# Patient Record
Sex: FEMALE | Race: WHITE | NOT HISPANIC OR LATINO | Employment: OTHER | URBAN - METROPOLITAN AREA
[De-identification: names, ages, dates, MRNs, and addresses within clinical notes are randomized per-mention and may not be internally consistent; named-entity substitution may affect disease eponyms.]

---

## 2019-11-14 ENCOUNTER — CONVERSION ENCOUNTER (OUTPATIENT)
Dept: FAMILY MEDICINE CLINIC | Facility: CLINIC | Age: 39
End: 2019-11-14

## 2019-11-14 ENCOUNTER — OFFICE VISIT CONVERTED (OUTPATIENT)
Dept: FAMILY MEDICINE CLINIC | Facility: CLINIC | Age: 39
End: 2019-11-14
Attending: NURSE PRACTITIONER

## 2019-11-25 ENCOUNTER — HOSPITAL ENCOUNTER (OUTPATIENT)
Dept: LAB | Facility: HOSPITAL | Age: 39
Discharge: HOME | End: 2019-11-25
Attending: NURSE PRACTITIONER

## 2019-11-25 LAB
ALBUMIN SERPL-MCNC: 4.2 G/DL (ref 3.5–5)
ALBUMIN/GLOB SERPL: 1.4 {RATIO} (ref 1.4–2.6)
ALP SERPL-CCNC: 57 U/L (ref 42–98)
ALT SERPL-CCNC: 12 U/L (ref 10–40)
ANION GAP SERPL CALC-SCNC: 16 MMOL/L (ref 8–19)
AST SERPL-CCNC: 16 U/L (ref 15–50)
BASOPHILS # BLD AUTO: 0.05 10*3/UL (ref 0–0.2)
BASOPHILS NFR BLD AUTO: 0.7 % (ref 0–3)
BILIRUB SERPL-MCNC: 0.67 MG/DL (ref 0.2–1.3)
BUN SERPL-MCNC: 12 MG/DL (ref 5–25)
BUN/CREAT SERPL: 17 {RATIO} (ref 6–20)
CALCIUM SERPL-MCNC: 9.2 MG/DL (ref 8.7–10.4)
CHLORIDE SERPL-SCNC: 103 MMOL/L (ref 99–111)
CHOLEST SERPL-MCNC: 140 MG/DL (ref 107–200)
CHOLEST/HDLC SERPL: 3.8 {RATIO} (ref 3–6)
CONV ABS IMM GRAN: 0.02 10*3/UL (ref 0–0.2)
CONV CO2: 23 MMOL/L (ref 22–32)
CONV IMMATURE GRAN: 0.3 % (ref 0–1.8)
CONV TOTAL PROTEIN: 7.1 G/DL (ref 6.3–8.2)
CREAT UR-MCNC: 0.69 MG/DL (ref 0.5–0.9)
DEPRECATED RDW RBC AUTO: 44.5 FL (ref 36.4–46.3)
EOSINOPHIL # BLD AUTO: 0.1 10*3/UL (ref 0–0.7)
EOSINOPHIL # BLD AUTO: 1.5 % (ref 0–7)
ERYTHROCYTE [DISTWIDTH] IN BLOOD BY AUTOMATED COUNT: 12.2 % (ref 11.7–14.4)
GFR SERPLBLD BASED ON 1.73 SQ M-ARVRAT: >60 ML/MIN/{1.73_M2}
GLOBULIN UR ELPH-MCNC: 2.9 G/DL (ref 2–3.5)
GLUCOSE SERPL-MCNC: 87 MG/DL (ref 65–99)
HCT VFR BLD AUTO: 38.2 % (ref 37–47)
HDLC SERPL-MCNC: 37 MG/DL (ref 40–60)
HGB BLD-MCNC: 12.8 G/DL (ref 12–16)
LDLC SERPL CALC-MCNC: 93 MG/DL (ref 70–100)
LYMPHOCYTES # BLD AUTO: 2.23 10*3/UL (ref 1–5)
LYMPHOCYTES NFR BLD AUTO: 32.7 % (ref 20–45)
MCH RBC QN AUTO: 33.2 PG (ref 27–31)
MCHC RBC AUTO-ENTMCNC: 33.5 G/DL (ref 33–37)
MCV RBC AUTO: 99 FL (ref 81–99)
MONOCYTES # BLD AUTO: 0.53 10*3/UL (ref 0.2–1.2)
MONOCYTES NFR BLD AUTO: 7.8 % (ref 3–10)
NEUTROPHILS # BLD AUTO: 3.89 10*3/UL (ref 2–8)
NEUTROPHILS NFR BLD AUTO: 57 % (ref 30–85)
NRBC CBCN: 0 % (ref 0–0.7)
OSMOLALITY SERPL CALC.SUM OF ELEC: 285 MOSM/KG (ref 273–304)
PLATELET # BLD AUTO: 233 10*3/UL (ref 130–400)
PMV BLD AUTO: 11.3 FL (ref 9.4–12.3)
POTASSIUM SERPL-SCNC: 4.2 MMOL/L (ref 3.5–5.3)
RBC # BLD AUTO: 3.86 10*6/UL (ref 4.2–5.4)
SODIUM SERPL-SCNC: 138 MMOL/L (ref 135–147)
T4 FREE SERPL-MCNC: 1.2 NG/DL (ref 0.9–1.8)
TRIGL SERPL-MCNC: 51 MG/DL (ref 40–150)
TSH SERPL-ACNC: 1.83 M[IU]/L (ref 0.27–4.2)
VLDLC SERPL-MCNC: 10 MG/DL (ref 5–37)
WBC # BLD AUTO: 6.82 10*3/UL (ref 4.8–10.8)

## 2019-12-30 ENCOUNTER — HOSPITAL ENCOUNTER (OUTPATIENT)
Dept: GENERAL RADIOLOGY | Facility: HOSPITAL | Age: 39
Discharge: HOME | End: 2019-12-30
Attending: NURSE PRACTITIONER

## 2020-03-13 ENCOUNTER — OFFICE VISIT CONVERTED (OUTPATIENT)
Dept: FAMILY MEDICINE CLINIC | Facility: CLINIC | Age: 40
End: 2020-03-13
Attending: PHYSICIAN ASSISTANT

## 2020-05-18 ENCOUNTER — TELEMEDICINE CONVERTED (OUTPATIENT)
Dept: FAMILY MEDICINE CLINIC | Facility: CLINIC | Age: 40
End: 2020-05-18
Attending: NURSE PRACTITIONER

## 2020-09-24 ENCOUNTER — HOSPITAL ENCOUNTER (OUTPATIENT)
Dept: LAB | Facility: HOSPITAL | Age: 40
Discharge: HOME | End: 2020-09-24
Attending: NURSE PRACTITIONER

## 2020-09-24 LAB
ALBUMIN SERPL-MCNC: 4.3 G/DL (ref 3.5–5)
ALBUMIN/GLOB SERPL: 1.6 {RATIO} (ref 1.4–2.6)
ALP SERPL-CCNC: 69 U/L (ref 42–98)
ALT SERPL-CCNC: 12 U/L (ref 10–40)
ANION GAP SERPL CALC-SCNC: 14 MMOL/L (ref 8–19)
AST SERPL-CCNC: 19 U/L (ref 15–50)
BASOPHILS # BLD AUTO: 0.04 10*3/UL (ref 0–0.2)
BASOPHILS NFR BLD AUTO: 0.7 % (ref 0–3)
BILIRUB SERPL-MCNC: 0.55 MG/DL (ref 0.2–1.3)
BUN SERPL-MCNC: 13 MG/DL (ref 5–25)
BUN/CREAT SERPL: 14 {RATIO} (ref 6–20)
CALCIUM SERPL-MCNC: 9.4 MG/DL (ref 8.7–10.4)
CHLORIDE SERPL-SCNC: 103 MMOL/L (ref 99–111)
CHOLEST SERPL-MCNC: 160 MG/DL (ref 107–200)
CHOLEST/HDLC SERPL: 4.7 {RATIO} (ref 3–6)
CONV ABS IMM GRAN: 0.01 10*3/UL (ref 0–0.2)
CONV CO2: 27 MMOL/L (ref 22–32)
CONV IMMATURE GRAN: 0.2 % (ref 0–1.8)
CONV TOTAL PROTEIN: 7 G/DL (ref 6.3–8.2)
CREAT UR-MCNC: 0.9 MG/DL (ref 0.5–0.9)
DEPRECATED RDW RBC AUTO: 45.3 FL (ref 36.4–46.3)
EOSINOPHIL # BLD AUTO: 0.1 10*3/UL (ref 0–0.7)
EOSINOPHIL # BLD AUTO: 1.6 % (ref 0–7)
ERYTHROCYTE [DISTWIDTH] IN BLOOD BY AUTOMATED COUNT: 11.9 % (ref 11.7–14.4)
GFR SERPLBLD BASED ON 1.73 SQ M-ARVRAT: >60 ML/MIN/{1.73_M2}
GLOBULIN UR ELPH-MCNC: 2.7 G/DL (ref 2–3.5)
GLUCOSE SERPL-MCNC: 102 MG/DL (ref 65–99)
HCT VFR BLD AUTO: 39.7 % (ref 37–47)
HDLC SERPL-MCNC: 34 MG/DL (ref 40–60)
HGB BLD-MCNC: 13 G/DL (ref 12–16)
LDLC SERPL CALC-MCNC: 109 MG/DL (ref 70–100)
LYMPHOCYTES # BLD AUTO: 2.6 10*3/UL (ref 1–5)
LYMPHOCYTES NFR BLD AUTO: 42.8 % (ref 20–45)
MCH RBC QN AUTO: 33.1 PG (ref 27–31)
MCHC RBC AUTO-ENTMCNC: 32.7 G/DL (ref 33–37)
MCV RBC AUTO: 101 FL (ref 81–99)
MONOCYTES # BLD AUTO: 0.42 10*3/UL (ref 0.2–1.2)
MONOCYTES NFR BLD AUTO: 6.9 % (ref 3–10)
NEUTROPHILS # BLD AUTO: 2.91 10*3/UL (ref 2–8)
NEUTROPHILS NFR BLD AUTO: 47.8 % (ref 30–85)
NRBC CBCN: 0 % (ref 0–0.7)
OSMOLALITY SERPL CALC.SUM OF ELEC: 290 MOSM/KG (ref 273–304)
PLATELET # BLD AUTO: 258 10*3/UL (ref 130–400)
PMV BLD AUTO: 11.5 FL (ref 9.4–12.3)
POTASSIUM SERPL-SCNC: 4.1 MMOL/L (ref 3.5–5.3)
RBC # BLD AUTO: 3.93 10*6/UL (ref 4.2–5.4)
SODIUM SERPL-SCNC: 140 MMOL/L (ref 135–147)
T4 FREE SERPL-MCNC: 1.3 NG/DL (ref 0.9–1.8)
TRIGL SERPL-MCNC: 87 MG/DL (ref 40–150)
TSH SERPL-ACNC: 1.86 M[IU]/L (ref 0.27–4.2)
VLDLC SERPL-MCNC: 17 MG/DL (ref 5–37)
WBC # BLD AUTO: 6.08 10*3/UL (ref 4.8–10.8)

## 2020-09-28 ENCOUNTER — TELEMEDICINE CONVERTED (OUTPATIENT)
Dept: FAMILY MEDICINE CLINIC | Facility: CLINIC | Age: 40
End: 2020-09-28
Attending: NURSE PRACTITIONER

## 2020-10-06 ENCOUNTER — OFFICE VISIT CONVERTED (OUTPATIENT)
Dept: PSYCHIATRY | Facility: CLINIC | Age: 40
End: 2020-10-06
Attending: STUDENT IN AN ORGANIZED HEALTH CARE EDUCATION/TRAINING PROGRAM

## 2020-11-11 ENCOUNTER — OFFICE VISIT CONVERTED (OUTPATIENT)
Dept: PSYCHIATRY | Facility: CLINIC | Age: 40
End: 2020-11-11
Attending: STUDENT IN AN ORGANIZED HEALTH CARE EDUCATION/TRAINING PROGRAM

## 2020-11-16 ENCOUNTER — HOSPITAL ENCOUNTER (OUTPATIENT)
Dept: URGENT CARE | Facility: CLINIC | Age: 40
Discharge: HOME | End: 2020-11-16
Attending: NURSE PRACTITIONER

## 2020-11-18 ENCOUNTER — TELEMEDICINE CONVERTED (OUTPATIENT)
Dept: FAMILY MEDICINE CLINIC | Facility: CLINIC | Age: 40
End: 2020-11-18
Attending: NURSE PRACTITIONER

## 2020-11-18 LAB — BACTERIA SPEC AEROBE CULT: NORMAL

## 2020-11-19 LAB — SARS-COV-2 RNA SPEC QL NAA+PROBE: NOT DETECTED

## 2020-12-11 ENCOUNTER — TELEMEDICINE CONVERTED (OUTPATIENT)
Dept: PSYCHIATRY | Facility: CLINIC | Age: 40
End: 2020-12-11
Attending: STUDENT IN AN ORGANIZED HEALTH CARE EDUCATION/TRAINING PROGRAM

## 2021-01-12 ENCOUNTER — TELEMEDICINE CONVERTED (OUTPATIENT)
Dept: PSYCHIATRY | Facility: CLINIC | Age: 41
End: 2021-01-12
Attending: STUDENT IN AN ORGANIZED HEALTH CARE EDUCATION/TRAINING PROGRAM

## 2021-03-12 ENCOUNTER — TELEMEDICINE CONVERTED (OUTPATIENT)
Dept: PSYCHIATRY | Facility: CLINIC | Age: 41
End: 2021-03-12
Attending: STUDENT IN AN ORGANIZED HEALTH CARE EDUCATION/TRAINING PROGRAM

## 2021-05-10 NOTE — H&P
"   History and Physical      Patient Name: Judith Begum   Patient ID: [id number]   Sex: Female   YOB: 1980    Primary Care Provider: Vanessa STAFFORD   Referring Provider: Vanessa STAFFORD    Visit Date: October 6, 2020    Provider: Allen Church MD   Location: [facility]   Location Address: [address] [place] [address]   Location Phone: [phone]          Chief Complaint     pt presents with symptoms of add    \"Well, this may all be overload.\"       History Of Present Illness  Judith Begum is a 40 year old /White female, few medical comorbidities, who presents to the office today referred by Vanessa STAFFORD for evaluation of ADHD.   Seen September 24. BMI is 25.3. Labs from September: HDL is slightly low at 34, LDL slightly high at 109, glucose 102, ALT 12 AST 19 creatinine 0.9 hematocrit 39.7 sodium 140 potassium 4.1 chloride 103 calcium 9.4 CO2 27 TSH 1.86 free T4 1.3. No EKG, no head imaging.   ASR S version 1.1 ADHD screen is positive, see scan. PHQ 9 score today is 9, mild. Hilario 7 score is 13, moderate.   Patient reports she has 3 children, including twins, all adopted from foster care. The oldest now has ADHD issues. 1 of the twins, Cuca, has developed celiac disease. Patient worries a lot about the pandemic, her job (she runs a hair salon), her depressed mom, and her children. Feels guilty that \"I am not doing enough.\" Issues with concentration: \"I am jumping from one thing to another And not completing anything.\" States she is experiencing more anxiety than depression, endorsing muscle tension in the form of clenching her teeth, fatigue, poor concentration, restlessness, irritability. Denies insomnia, claiming she sleeps very well.   Minimal issues with concentration, attention, hyperactivity in childhood.   Denies SI HI AVH. Denies guns at home. Review of systems is negative for PTSD, psychosis, OCD, letitia, and positive for " anxiety and depression.   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 Closed for 3 months until the end of May due to the pandemic. Works  from 8 30-4 30, often not getting a lunch break   Kids: [employer]   House: 3    Hx:  HX   Family History:  Suicide Attempts: Denies denies   Suicide Completions: Denies   Substance Use: Denies   Psychiatric Conditions: Mom and sister both have depression    depression, psychosis, anxiety: Some  issues with regard to having 7 miscarriages. Patient reports she did have depressive and anxiety symptoms.   Developmental History:  Born: Born and raised in [place]   Siblings: 1 sister   Childhood: Lost her father to cancer at 18 years of age, patient became very tearful when discussing this. He was soulmates with her mom. Mom has since not remarried, and continues to face depression.   High School: Finished   College: [employer]   Access to Weapons: Denies   Thought Content: no suicidal ideation, no homicidal ideation, no auditory hallucinations, and no visual hallucinations       Past Medical History  Disease Name Date Onset Notes   Depression (emotion) --  --          Past Surgical History  Procedure Name Date Notes   ACL Reconstruction  --    D and C  --          Medication List  Name Date Started Instructions   Dupixent 300 mg/2 mL subcutaneous syringe   inject 2 milliliters (300 mg) by subcutaneous route every 2 weeks in the abdomen, thigh, or upper arm rotating injection sites         Allergy List  Allergen Name Date Reaction Notes   Latex --  --  --          Family Medical History  Disease Name Relative/Age Notes   Breast Neoplasm, Malignant Aunt/   --    Stroke Father/   --    Colon Cancer Grandmother (maternal)/   --    Lung cancer Father/  Grandfather (paternal)/   --    Diabetes Grandmother (maternal)/   --          Social History  Finding Status Start/Stop Quantity Notes   Alcohol Never --/-- --  --    Exercises 3 to 4 times per week --  --/-- --  --     --  --/-- --  --    Tobacco Never --/-- --  --          Immunizations  NameDate Admin Mfg Trade Name Lot Number Route Inj VIS Given VIS Publication   [id number]/14/2019 [facility] Fluzone Quadrivalent [id number] IM RD 11/14/2019    Comments: Pt tolerated injection well         Review of Systems  · Constitutional  o Denies  o : fatigue, night sweats  · Eyes  o Denies  o : double vision, blurred vision  · HENT  o Denies  o : vertigo, recent head injury  · Cardiovascular  o Denies  o : chest pain, irregular heart beats  · Respiratory  o Denies  o : shortness of breath, productive cough  · Gastrointestinal  o Denies  o : nausea, vomiting  · Genitourinary  o Denies  o : dysuria, urinary retention  · Integument  o Denies  o : hair growth change, new skin lesions  · Neurologic  o Denies  o : altered mental status, seizures  · Musculoskeletal  o Denies  o : joint swelling, limitation of motion  · Endocrine  o Denies  o : cold intolerance, heat intolerance  · Psychiatric  o Admits  o : anxiety, obsessive compulsive disorder  o Denies  o : depression, post traumatic stress disorder, psychosis, letitia  · Allergic-Immunologic  o Denies  o : frequent illnesses      Vitals  Date Time BP Position Site L\R Cuff Size HR RR TEMP (F) WT  HT  BMI kg/m2 BSA m2 O2 Sat FR L/min FiO2        10/06/2020 08:38 /100 Sitting    85  "- R 16 97.5 156lbs 2oz 5'  5\" 25.98 1.8 99 %  21%          Physical Examination  · Mental Status Exam  o Appearance  o : good eye contact, normal street clothes, groomed, wearing a mask  o Behavior  o : pleasant and cooperative  o Motor  o : no abnormal  o Speech  o : normal rhythm, rate, volume, tone, not hyperverbal, not pressured, normal prosidy  o Mood  o : \"I get overwhelmed\"  o Affect  o : Slightly anxious, constricted, tears when discussing her father  o Thought Content  o : negative suicidal ideations, negative homicidal ideations, negative obsessions  o Perceptions  o : negative auditory hallucinations, negative visual hallucinations  o Thought Process  o : linear  o Insight/Judgement  o : fair/fair  o Cognition  o : grossly intact  o Attention  o : intact          Assessment  · Anxiety Disorder     300.00/F41.9  · Depressive Disorder     311/F32.9       Presentation most consistent with anxiety, unspecified.  Some depressive symptoms, but these may be related to bereavement of her father, who patient was very close to.  Patient's history is not compelling for ADHD, although we will continue to consider this as a possible diagnosis.  Poor concentration could be related to the anxiety the patient is experiencing.    We will start the patient on escitalopram and see back in a month.  Therapy was recommended for bereavement; patient to consider.       Plan  · Orders  o ACO-39: Current medications updated and reviewed (, 1159F) - - 10/06/2020  · Medications  o escitalopram oxalate 5 mg oral tablet   SIG: take 1 tablet (5 mg) by oral route QD for 7 days, then increase to 2 tablets daily (10 mg)   DISP: (60) Tablet with 1 refills  Prescribed on 10/06/2020     o Medications have been Reconciled  o Transition of Care or Provider Policy  · Instructions  o Risk Assessment: Risk of self-harm acutely is low. Risk factors include anxiety disorder. Protective factors include her family, no suicide attempt in the " past, no self-harm, no AODA, healthcare seeking, willingness to engage in care, future orientation. Risk of self-harm chronically is also low, but could be further elevated in the event of treatment noncompliance and/or AODA.  o Safety: No acute safety concerns.  o Medications: Start escitalopram 10 mg p.o. daily. Risks, benefits, alternatives discussed with patient including headaches, sexual dysfunction, GI upset, nausea. After discussion of these risks and benefits, the patient voiced understanding and agreed to proceed.  o Therapy: Recommended; patient to consider.  o Follow Up: 1 month.  · Disposition  o Follow Up in 1 month.            Electronically Signed by: Allen Church MD -Author on October 6, 2020 12:52:16 PM

## 2021-05-13 NOTE — PROGRESS NOTES
Progress Note      Patient Name: Judith Begum   Patient ID: [id number]   Sex: Female   YOB: 1980    Primary Care Provider: Vanessa STAFFORD   Referring Provider: Vanessa STAFFORD    Visit Date: November 18, 2020    Provider: RIVERA Valle   Location: [facility]   Location Address: [address] [place] [address], [address]  [place], [place]  [id number]   Location Phone: [phone]          Chief Complaint  · 6 month follow up      History Of Present Illness  Video Conferencing Visit  Judith Begum is a 40 year old /White female who is presenting for evaluation via video conferencing via google duo. Verbal consent obtained before beginning visit.   The following staff were present during this visit: Julia Sheth MA   Judith Begum is a 40 year old /White female who presents for evaluation and treatment of:      Pt is doing her 6 month follow up. She is doing okay, went to the [facility] for a sinus infection. She was tested for Covid and was negative. She see's Dr. Church for her anxiety, last visit was 11/11/20.     Pt has not received her flu vaccine.     Pt will be due a mammogram in December and would like to go ahead and schedule.       Past Medical History  Disease Name Date Onset Notes   ADD (attention deficit disorder) --  --    Depression (emotion) --  --          Past Surgical History  Procedure Name Date Notes   ACL Reconstruction 2011 --    D and C 2009 --    Thyroid surgery --  --          Medication List  Name Date Started Instructions   bupropion HCl 150 mg oral tablet extended release 24 hr 11/11/2020 take 1 tablet (150 mg) by oral route once daily for 30 days   Dupixent 300 mg/2 mL subcutaneous syringe  inject 2 milliliters (300 mg) by subcutaneous route every 2 weeks in the abdomen, thigh, or upper arm rotating injection sites   escitalopram oxalate 5 mg oral tablet 11/11/2020 take 1 tablet (5 mg) by oral route QD for 7 days, then increase to  2 tablets daily (10 mg)         Allergy List  Allergen Name Date Reaction Notes   Latex --  --  --          Family Medical History  Disease Name Relative/Age Notes   Breast Neoplasm, Malignant Aunt/   --    Stroke Father/   --    Colon Cancer Grandmother (maternal)/   --    Lung cancer Father/  Grandfather (paternal)/   --    Diabetes Grandmother (maternal)/   --          Social History  Finding Status Start/Stop Quantity Notes   Alcohol Never --/-- --  11/11/2020 - 10/6/2020   Exercises 3 to 4 times per week --  --/-- --  --     --  --/-- --  --    Tobacco Never --/-- --  --          Immunizations  NameDate Admin Mfg Trade Name Lot Number Route Inj VIS Given VIS Publication   [id number]/14/2019 [facility] Fluzone Quadrivalent [id number] IM RD 11/14/2019    Comments: Pt tolerated injection well         Review of Systems  · Constitutional  o Denies  o : fever, fatigue, weight loss, weight gain  · Breasts  o Denies  o : lumps, tenderness, swelling, nipple discharge  · Cardiovascular  o Denies  o : lower extremity edema, claudication, chest pressure, palpitations  · Respiratory  o Denies  o : shortness of breath, wheezing, cough, hemoptysis, dyspnea on exertion  · Gastrointestinal  o Denies  o : nausea, vomiting, diarrhea, constipation, abdominal pain  · Psychiatric  o Admits  o : anxiety, depression      Physical Examination  · Constitutional  o Appearance  o : well-nourished, well developed, alert, in no acute distress  · Neurologic  o Mental Status Examination  o :   § Orientation  § : grossly oriented to person, place and time  · Psychiatric  o Mood and Affect  o : mood normal, affect appropriate, denies any SI/HI          Assessment  · Anxiety disorder     300.00/F41.9  · Depression     311/F32.9  · Visit for screening mammogram     V76.12/Z12.31  · Eczema     692.9/L30.9    Problems Reconciled  Plan  · Orders  o Screening Mammography; Bilateral 3D (93393, , 33431) - V76.12/Z12.31 - 03/30/2021   GERMAINE 3/30/2021  "at 4pm  o ACO-39: Current medications updated and reviewed (, 1159F) - - 11/18/2020  o ACO-14: Influenza immunization was not administered for reasons documented [facility] () - - 11/18/2020  · Medications  o bupropion HCl 150 mg oral tablet extended release 24 hr   SIG: take 1 tablet (150 mg) by oral route once daily for 30 days   DISP: (30) Tablet with 1 refills  Discontinued on 11/18/2020     o Medications have been Reconciled  o Transition of Care or Provider Policy  · Instructions  o Patient agrees to a \"No Self Harm\" contract. Patient will either call , 911, ER, [facility], [facility].  o Patient agrees to a \"No Self Harm\" contract. Patient will either call , 1, ER, [facility], [facility].  o Patient was educated/instructed on their diagnosis, treatment and medications prior to discharge from the clinic today.  o Patient instructed to seek medical attention urgently for new or worsening symptoms.  o Call the office with any concerns or questions.  o pt to continue f/u with Dr. Church for PSY care  · Disposition  o Return Visit Request in/on 6 months +/- 2 weeks (81756).            Electronically Signed by: RIVERA Valle -Author on November 18, 2020 10:08:46 AM  "

## 2021-05-13 NOTE — PROGRESS NOTES
"   Progress Note      Patient Name: Judith Begum   Patient ID: [id number]   Sex: Female   YOB: 1980    Primary Care Provider: Vanessa STAFFORD   Referring Provider: Vanessa STAFFORD    Visit Date: December 11, 2020    Provider: Allen Church MD   Location: [facility]   Location Address: [address] [place] [address]  [id number]   Location Phone: [phone]          Chief Complaint     Pt is here for a 1 month follow up for anxiety    \"Most days are well.\"             History Of Present Illness  Judith Begum is a 40 year old /White female, few medical comorbidities, who presents to the office today referred by Vanessa STAFFORD for evaluation of ADHD.   Seen September 24. BMI is 25.3. Labs from September: HDL is slightly low at 34, LDL slightly high at 109, glucose 102, ALT 12 AST 19 creatinine 0.9 hematocrit 39.7 sodium 140 potassium 4.1 chloride 103 calcium 9.4 CO2 27 TSH 1.86 free T4 1.3. No EKG, no head imaging. ASRS version 1.1 ADHD screen is positive, see scan 10/6. PHQ 9 score 10/6 is 9, mild. Hilario 7 score is 13, moderate.   Virtual visit via Zoom audio and video due to the COVID-19 pandemic. Patient is accepting of and agreeable to appointment. The appointment consisted of the patient and I only. Interview: Patient reports \"most days are well.\" Reports continued control over anxiety with Escitalopram 10 mg p.o. daily. She did not tolerate increasing the dose to 15 mg p.o. daily due to GI upset, so she reduced the dose on her own. Still has some issues with concentration. This issue is only in one environment, the home environment. The patient denies having concentration issues at work. Denies SI HI AVH.   ...   ...   ...       Past Medical History  Disease Name Date Onset Notes   ADD (attention deficit disorder) --  --    Anxiety 11/18/2020 --    Depression (emotion) --  --          Past Surgical History  Procedure Name Date Notes   ACL " Reconstruction 2011 --    D and C 2009 --    Thyroid surgery --  --          Medication List  Name Date Started Instructions   Dupixent 300 mg/2 mL subcutaneous syringe  inject 2 milliliters (300 mg) by subcutaneous route every 2 weeks in the abdomen, thigh, or upper arm rotating injection sites   escitalopram oxalate 5 mg oral tablet 11/18/2020 take 3 tablets (15 mg) by oral route once daily for 30 days         Allergy List  Allergen Name Date Reaction Notes   Latex --  --  --        Allergies Reconciled  Family Medical History  Disease Name Relative/Age Notes   Breast Neoplasm, Malignant Aunt/   --    Stroke Father/   --    Colon Cancer Grandmother (maternal)/   --    Lung cancer Father/  Grandfather (paternal)/   --    Diabetes Grandmother (maternal)/   --          Social History  Finding Status Start/Stop Quantity Notes   Alcohol Never --/-- --  12/11/2020 - 11/11/2020 - 10/6/2020   Exercises 3 to 4 times per week --  --/-- --  --     --  --/-- --  --    Tobacco Never --/-- --  12/11/2020 -          Immunizations  NameDate Admin Mfg Trade Name Lot Number Route Inj VIS Given VIS Publication   [id number]/14/2019 [facility] Fluzone Quadrivalent [id number] IM RD 11/14/2019    Comments: Pt tolerated injection well         Review of Systems  · Constitutional  o Admits  o : fatigue  o Denies  o : night sweats  · Eyes  o Denies  o : double vision, blurred vision  · HENT  o Denies  o : vertigo, recent head injury  · Breasts  o Denies  o : abnormal changes in breast size, additional breast symptoms except as noted in the HPI  · Cardiovascular  o Denies  o : chest pain, irregular heart beats  · Respiratory  o Denies  o : shortness of breath, productive cough  · Gastrointestinal  o Admits  o : nausea  o Denies  o : vomiting  · Genitourinary  o Denies  o : dysuria, urinary retention  · Integument  o Denies  o : hair growth change, new skin lesions  · Neurologic  o Denies  o : altered mental status,  "seizures  · Musculoskeletal  o Denies  o : joint swelling, limitation of motion  · Endocrine  o Denies  o : cold intolerance, heat intolerance  · Psychiatric  o Admits  o : anxiety, inattentiveness  · Heme-Lymph  o Denies  o : petechiae, lymph node enlargement or tenderness  · Allergic-Immunologic  o Denies  o : frequent illnesses      Physical Examination  · Mental Status Exam  o Appearance  o : good eye contact, normal street clothes, groomed, sitting in her car  o Behavior  o : pleasant and cooperative  o Motor  o : no abnormal  o Speech  o : normal rhythm, rate, volume, tone, not hyperverbal, not pressured, normal prosidy  o Mood  o : \"Most days are well\"  o Affect  o : Able to smile and laugh, euthymic  o Thought Content  o : negative suicidal ideations, negative homicidal ideations, negative obsessions  o Perceptions  o : negative auditory hallucinations, negative visual hallucinations  o Thought Process  o : linear  o Insight/Judgement  o : fair/fair  o Cognition  o : grossly intact  o Attention  o : intact          Assessment  · Anxiety Disorder     300.00/F41.9  · Depressive Disorder     311/F32.9       Presentation still most consistent with anxiety, unspecified.  Some residual concentration issues.  Status post Wellbutrin.  Status post Escitalopram 15 mg p.o. daily.    Continue Escitalopram 10 mg p.o. daily.  Start low-dose Strattera.  Patient mentioned her friend who was started on a low-dose of Adderall for concentration issues.  I counseled the patient that we will only start a stimulant if the diagnosis of ADHD is made, and that will require an extensive evaluation utilizing the DIVA 2.0 with her parent (her mom) present, to attempt to make the diagnosis.  Patient to consider.  See back in 1 month.    No interest in outside psychotherapy.       Plan  · Orders  o ACO-39: Current medications updated and reviewed (, 1159F) - - 12/11/2020  · Medications  o atomoxetine 10 mg oral capsule   SIG: take 2 " capsules by oral route daily for 30 days   DISP: (60) Capsule with 1 refills  Prescribed on 12/11/2020     o Medications have been Reconciled  o Transition of Care or Provider Policy  · Instructions  o Risk Assessment: Risk of self-harm acutely is low. Risk factors include anxiety disorder. Protective factors include her family, no suicide attempt in the past, no self-harm, no AODA, healthcare seeking, willingness to engage in care, future orientation. Risk of self-harm chronically is also low, but could be further elevated in the event of treatment noncompliance and/or AODA.  o Safety: No acute safety concerns.  o Medications: Continue escitalopram 10 mg p.o. daily. START atomoxetine 20 mg PO QDAY to target concentration and anxiety. Risks, benefits, alternatives discussed with patient including nausea, GI upset, sedation, exacerbation of irritability.  o Therapy: Declines.  o Labs/Studies:   o Follow Up: 1 month.  · Disposition  o Follow Up in 1 month.            Electronically Signed by: Allen Church MD -Author on December 11, 2020 11:18:58 AM

## 2021-05-13 NOTE — PROGRESS NOTES
"   Progress Note      Patient Name: Judith Begum   Patient ID: [id number]   Sex: Female   YOB: 1980    Primary Care Provider: Vanessa STAFFORD   Referring Provider: Vanessa STAFFORD    Visit Date: November 11, 2020    Provider: Allen Church MD   Location: [facility]   Location Address: [address] [place] [address]  [id number]   Location Phone: [phone]          Chief Complaint     Pt is here for a 1 month follow up for anxiety             History Of Present Illness  Judith Begum is a 40 year old /White female, few medical comorbidities, who presents to the office today referred by Vanessa STAFFORD for evaluation of ADHD.   Seen September 24. BMI is 25.3. Labs from September: HDL is slightly low at 34, LDL slightly high at 109, glucose 102, ALT 12 AST 19 creatinine 0.9 hematocrit 39.7 sodium 140 potassium 4.1 chloride 103 calcium 9.4 CO2 27 TSH 1.86 free T4 1.3. No EKG, no head imaging. ASRS version 1.1 ADHD screen is positive, see scan 10/6. PHQ 9 score 10/6 is 9, mild. Hilario 7 score is 13, moderate.   Patient is well, stating \"I'm good.\" States no further muscle tension, restlessness, or irritability. Denies depression. Eating and sleeping well. Still notes concentration issues. Denies SI HI AVH.   ...   ...   ...       Past Medical History  Disease Name Date Onset Notes   ADD (attention deficit disorder) --  --    Depression (emotion) --  --          Past Surgical History  ACL Reconstruction; D and C; Thyroid surgery         Medication List  Name Date Started Instructions   Dupixent 300 mg/2 mL subcutaneous syringe  inject 2 milliliters (300 mg) by subcutaneous route every 2 weeks in the abdomen, thigh, or upper arm rotating injection sites   escitalopram oxalate 5 mg oral tablet 11/11/2020 take 1 tablet (5 mg) by oral route QD for 7 days, then increase to 2 tablets daily (10 mg)         Allergy List  Allergen Name Date Reaction Notes   Latex --  " "--  --        Allergies Reconciled  Family Medical History  Breast Neoplasm, Malignant; Stroke; Colon Cancer; Lung cancer; Diabetes         Social History  Finding Status Start/Stop Quantity Notes   Alcohol Never --/-- --  11/11/2020 - 10/6/2020   Exercises 3 to 4 times per week --  --/-- --  --     --  --/-- --  --    Tobacco Never --/-- --  --          Immunizations  Name Date Admin   Influenza 11/14/2019         Review of Systems  · Constitutional  o Denies  o : fatigue, night sweats  · Eyes  o Denies  o : double vision, blurred vision  · HENT  o Denies  o : vertigo, recent head injury  · Breasts  o Denies  o : abnormal changes in breast size, additional breast symptoms except as noted in the HPI  · Cardiovascular  o Denies  o : chest pain, irregular heart beats  · Respiratory  o Denies  o : shortness of breath, productive cough  · Gastrointestinal  o Denies  o : nausea, vomiting  · Genitourinary  o Denies  o : dysuria, urinary retention  · Integument  o Denies  o : hair growth change, new skin lesions  · Neurologic  o Denies  o : altered mental status, seizures  · Musculoskeletal  o Denies  o : joint swelling, limitation of motion  · Endocrine  o Denies  o : cold intolerance, heat intolerance  · Heme-Lymph  o Denies  o : petechiae, lymph node enlargement or tenderness  · Allergic-Immunologic  o Denies  o : frequent illnesses      Vitals  Date Time BP Position Site L\R Cuff Size HR RR TEMP (F) WT  HT  BMI kg/m2 BSA m2 O2 Sat FR L/min FiO2 HC       11/11/2020 10:50 /88 Sitting    83 - R 18  153lbs 2oz 5'  5\" 25.48 1.78 100 %  21%          Physical Examination  · Mental Status Exam  o Appearance  o : good eye contact, normal street clothes, groomed, wearing a mask  o Behavior  o : pleasant and cooperative  o Motor  o : no abnormal  o Speech  o : normal rhythm, rate, volume, tone, not hyperverbal, not pressured, normal prosidy  o Mood  o : \"I'm good.\"  o Affect  o : slight constriction, no " tears  o Thought Content  o : negative suicidal ideations, negative homicidal ideations, negative obsessions  o Perceptions  o : negative auditory hallucinations, negative visual hallucinations  o Thought Process  o : linear  o Insight/Judgement  o : fair/fair  o Cognition  o : grossly intact  o Attention  o : intact          Assessment  · Anxiety Disorder     300.00/F41.9  · Depressive Disorder     311/F32.9       Presentation still most consistent with anxiety, unspecified.  Some residual concentration issues. Start wellbutrin in addition to escitalopram. See back in one month.     No interest in outside psychotherapy. 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       Plan  · Orders  o ACO-39: Current medications updated and reviewed (, 1159F) - - 11/11/2020  · Medications  o bupropion HCl 150 mg oral tablet extended release 24 hr   SIG: take 1 tablet (150 mg) by oral route once daily for 30 days   DISP: (30) Tablet with 1 refills  Prescribed on 11/11/2020     o escitalopram oxalate 5 mg oral tablet   SIG: take 1 tablet (5 mg) by oral route QD for 7 days, then increase to 2 tablets daily (10 mg)   DISP: (60) Tablet with 1 refills  Refilled on 11/11/2020     o Medications have been Reconciled  o Transition of Care or Provider Policy  · Instructions  o Risk Assessment: Risk of self-harm acutely is low. Risk factors include anxiety disorder. Protective factors include her family, no suicide attempt in the past, no self-harm, no AODA, healthcare seeking, willingness to engage in care, future orientation. Risk of self-harm chronically is also low, but could be further elevated in the event of treatment noncompliance and/or AODA.  o Safety: No acute safety concerns.  o Medications: Continue escitalopram 10 mg p.o. daily. START bupropion  mg PO QDAY to target concentration and anxiety. Risks, benefits,  alternatives discussed with patient including nausea, GI upset, increased energy, exacerbation of irritability.  o Therapy: 20 minutes of therapy today.  o Follow Up: 1 month.  · Disposition  o Follow Up in 1 month.            Electronically Signed by: Allen Church MD -Author on November 11, 2020 04:59:29 PM

## 2021-05-13 NOTE — PROGRESS NOTES
Progress Note      Patient Name: Judith Begum   Patient ID: [id number]   Sex: Female   YOB: 1980    Primary Care Provider: Vanessa STAFFORD   Referring Provider: Vanessa STAFFORD    Visit Date: May 18, 2020    Provider: RIVERA Valle   Location: [facility]   Location Address: [address] [place] [address], [address]  [place]  [id number]   Location Phone: [phone]          Chief Complaint  · 6 month follow up      History Of Present Illness  Video Conferencing Visit  Judith Begum is a 39 year old /White female who is presenting for evaluation via video conferencing. Verbal consent obtained before beginning visit.   The following staff were present during this visit: Vanessa STAFFORD and Julia Sheth MA   Judith Begum is a 39 year old /White female who presents for evaluation and treatment of:      Pt consented to telehealth visit via [employer] [facility] for her 6 month follow up. She is doing well, no issues or problems. Pt is due for labs and her pap smear is scanned into the chart.    She just recently adopted twin girls and they are doing well.  One was diagnosed with celiac disease but is doing much better since diagnosis.           Past Surgical History  Procedure Name Date Notes   ACL Reconstruction 2011 --    D and C 2009 --          Medication List  Name Date Started Instructions   Dupixent 300 mg/2 mL subcutaneous syringe  inject 2 milliliters (300 mg) by subcutaneous route every 2 weeks in the abdomen, thigh, or upper arm rotating injection sites         Allergy List  Allergen Name Date Reaction Notes   Latex --  --  --          Family Medical History  Disease Name Relative/Age Notes   Breast Neoplasm, Malignant Aunt/   --    Stroke Father/   --    Colon Cancer Grandmother (maternal)/   --    Lung cancer Father/  Grandfather (paternal)/   --    Diabetes Grandmother (maternal)/   --          Social History  Finding Status Start/Stop Quantity Notes    Alcohol Never --/-- --  --    Exercises 3 to 4 times per week --  --/-- --  --     --  --/-- --  --    Tobacco Never --/-- --  --          Immunizations  NameDate Admin Mfg Trade Name Lot Number Route Inj VIS Given VIS Publication   [id number]/14/2019 [facility] Fluzone Quadrivalent [id number] IM RD 11/14/2019    Comments: Pt tolerated injection well         Review of Systems  · Constitutional  o Denies  o : fever, fatigue, weight loss, weight gain  · Cardiovascular  o Denies  o : lower extremity edema, claudication, chest pressure, palpitations  · Respiratory  o Denies  o : shortness of breath, wheezing, cough, hemoptysis, dyspnea on exertion  · Gastrointestinal  o Denies  o : nausea, vomiting, diarrhea, constipation, abdominal pain      Physical Examination  · Constitutional  o Appearance  o : well-nourished, well developed, alert, in no acute distress  · Neurologic  o Mental Status Examination  o :   § Orientation  § : grossly oriented to person, place and time  · Psychiatric  o Mood and Affect  o : mood normal, affect appropriate, denies any SI/HI          Assessment  · Screening for lipid disorders     V77.91/Z13.220  · Routine lab draw     V72.60/Z01.89  · Screening for thyroid disorder     V77.0/Z13.29  · Eczema     692.9/L30.9      Plan  · Orders  o CBC with Auto Diff [facility] (89270) - - 05/18/2020  o CMP [facility] (86846) - - 05/18/2020  o Lipid Panel [facility] (58109) - - 05/18/2020  o Thyroid Profile (33846, THYII, 60619) - - 05/18/2020  o ACO-39: Current medications updated and reviewed () - - 05/18/2020  o Cervical cancer screening (Pap) results IN CHART and reviewed [facility] (3015F) - - 05/18/2020  · Medications  o Medications have been Reconciled  o Transition of Care or Provider Policy  · Instructions  o Patient instructed to seek medical attention urgently for new or worsening symptoms.  o Call the office with any concerns or questions.  · Disposition  o Call or Return if symptoms worsen or persist.  o Return Visit  Request in/on 1 year +/- 2 weeks (72411).            Electronically Signed by: RIVERA Valle -Author on May 18, 2020 03:20:12 PM

## 2021-05-13 NOTE — PROGRESS NOTES
Progress Note      Patient Name: Judith Begum   Patient ID: [id number]   Sex: Female   YOB: 1980    Primary Care Provider: Vanessa STAFFORD   Referring Provider: Vanessa STAFFORD    Visit Date: September 28, 2020    Provider: RIVERA Valle   Location[facility]   Location Address: [address] [place] [address], [address]  [place], [place]  [id number]   Location Phone: [phone]          Chief Complaint  · ADD eval      History Of Present Illness  Video Conferencing Visit  Judith Begum is a 40 year old /White female who is presenting for evaluation via video conferencing via [employer]. Verbal consent obtained before beginning visit.   The following staff were present during this visit: Julia Sheth MA   Judith Begum is a 40 year old /White female who presents for evaluation and treatment of:      Pt is needing to discuss possible ADD, she is struggling with completing tasks and not being able to focus on one thing at a time. She goes to complete one task and gets distracted by something else. She notes that this is getting worse as the day goes on.       Past Surgical History  Procedure Name Date Notes   ACL Reconstruction 2011 --    D and C 2009 --          Medication List  Name Date Started Instructions   Dupixent 300 mg/2 mL subcutaneous syringe  inject 2 milliliters (300 mg) by subcutaneous route every 2 weeks in the abdomen, thigh, or upper arm rotating injection sites         Allergy List  Allergen Name Date Reaction Notes   Latex --  --  --        Allergies Reconciled  Family Medical History  Disease Name Relative/Age Notes   Breast Neoplasm, Malignant Aunt/   --    Stroke Father/   --    Colon Cancer Grandmother (maternal)/   --    Lung cancer Father/  Grandfather (paternal)/   --    Diabetes Grandmother (maternal)/   --          Social History  Finding Status Start/Stop Quantity Notes   Alcohol Never --/-- --  --    Exercises 3 to 4 times per  week --  --/-- --  --     --  --/-- --  --    Tobacco Never --/-- --  --          Immunizations  NameDate Admin Mfg Trade Name Lot Number Route Inj VIS Given VIS Publication   [id number]/14/2019 [facility] Fluzone Quadrivalent [id number] IM RD 11/14/2019    Comments: Pt tolerated injection well         Review of Systems  · Constitutional  o Admits  o : trouble focusing  o Denies  o : fever, fatigue, weight loss, weight gain  · Cardiovascular  o Denies  o : lower extremity edema, claudication, chest pressure, palpitations  · Respiratory  o Denies  o : shortness of breath, wheezing, cough, hemoptysis, dyspnea on exertion  · Gastrointestinal  o Denies  o : nausea, vomiting, diarrhea, constipation, abdominal pain      Physical Examination  · Constitutional  o Appearance  o : well-nourished, well developed, alert, in no acute distress  · Neurologic  o Mental Status Examination  o :   § Orientation  § : grossly oriented to person, place and time  · Psychiatric  o Mood and Affect  o : mood normal, affect appropriate, denies any SI/HI          Assessment  · Attention and concentration deficit     799.51/R41.840  · Eczema     692.9/L30.9    Problems Reconciled  Plan  · Orders  o ACO-39: Current medications updated and reviewed (1159F, ) - - 09/28/2020  o PSYCHIATRY CONSULTATION (PSYCH) - - 09/28/2020  · Medications  o Medications have been Reconciled  o Transition of Care or Provider Policy  · Instructions  o Patient was educated/instructed on their diagnosis, treatment and medications prior to discharge from the clinic today.  o Patient instructed to seek medical attention urgently for new or worsening symptoms.  o Call the office with any concerns or questions.  · Disposition  o Call or Return if symptoms worsen or persist.            Electronically Signed by: RIVERA Valle -Author on September 28, 2020 02:01:02 PM

## 2021-05-14 VITALS
HEART RATE: 83 BPM | WEIGHT: 153.12 LBS | BODY MASS INDEX: 25.51 KG/M2 | HEIGHT: 65 IN | RESPIRATION RATE: 18 BRPM | OXYGEN SATURATION: 100 % | SYSTOLIC BLOOD PRESSURE: 143 MMHG | DIASTOLIC BLOOD PRESSURE: 88 MMHG

## 2021-05-14 VITALS
TEMPERATURE: 97.5 F | DIASTOLIC BLOOD PRESSURE: 100 MMHG | RESPIRATION RATE: 16 BRPM | BODY MASS INDEX: 26.01 KG/M2 | WEIGHT: 156.12 LBS | HEIGHT: 65 IN | OXYGEN SATURATION: 99 % | HEART RATE: 85 BPM | SYSTOLIC BLOOD PRESSURE: 152 MMHG

## 2021-05-14 NOTE — PROGRESS NOTES
"   Progress Note      Patient Name: Judith Begum   Patient ID: [id number]   Sex: Female   YOB: 1980    Primary Care Provider: Vanessa STAFFORD   Referring Provider: Vanessa STAFFORD    Visit Date: March 12, 2021    Provider: Allen Church MD   Location: [facility]   Location Address: [address] [place] [address]  [id number]   Location Phone: [phone]          Chief Complaint     \"I am doing pretty good.\"             History Of Present Illness  Judith Begum is a 40 year old /White female, few medical comorbidities, who presents to the office today referred by Vanessa STAFFORD for evaluation of ADHD.   Seen September 24. BMI is 25.3. Labs from September: HDL is slightly low at 34, LDL slightly high at 109, glucose 102, ALT 12 AST 19 creatinine 0.9 hematocrit 39.7 sodium 140 potassium 4.1 chloride 103 calcium 9.4 CO2 27 TSH 1.86 free T4 1.3. No EKG, no head imaging. ASRS version 1.1 ADHD screen is positive, see scan 10/6. PHQ 9 score 10/6 is 9, mild. Hilario 7 score is 13, moderate.   Virtual visit via Zoom audio and video due to the COVID-19 pandemic. Patient is accepting of and agreeable to appointment. The appointment consisted of the patient and I only. Interview: Continued stable mood. Denies depression and anxiety issues. Has not yet set up the [facility] referral as she has been busy with \"school, her kids, her job.\" We will set up when she has time. Denies SI HI AVH.   ...   ...   ...       Past Medical History  Disease Name Date Onset Notes   ADD (attention deficit disorder) --  --    Anxiety 11/18/2020 --    Depression (emotion) --  --          Past Surgical History  Procedure Name Date Notes   ACL Reconstruction 2011 --    D and C 2009 --    Thyroid surgery --  --          Medication List  Name Date Started Instructions   Dupixent 300 mg/2 mL subcutaneous syringe  inject 2 milliliters (300 mg) by subcutaneous route every 2 weeks in the " abdomen, thigh, or upper arm rotating injection sites   escitalopram oxalate 10 mg oral tablet 01/12/2021 take 1 tablet (10 mg) by oral route once daily for 90 days         Allergy List  Allergen Name Date Reaction Notes   Latex --  --  --          Family Medical History  Disease Name Relative/Age Notes   Breast Neoplasm, Malignant Aunt/   --    Stroke Father/   --    Colon Cancer Grandmother (maternal)/   --    Lung cancer Father/  Grandfather (paternal)/   --    Diabetes Grandmother (maternal)/   --          Social History  Finding Status Start/Stop Quantity Notes   Alcohol Never --/-- --  01/12/2021 - 12/11/2020 - 11/11/2020 - 10/6/2020   Exercises 3 to 4 times per week --  --/-- --  --     --  --/-- --  --    Tobacco Never --/-- --  01/12/2021 - 12/11/2020 -          Immunizations  NameDate Admin Mfg Trade Name Lot Number Route Inj VIS Given VIS Publication   [id number]/14/2019 [facility] Fluzone Quadrivalent [id number] IM RD 11/14/2019    Comments: Pt tolerated injection well         Review of Systems  · Constitutional  o Denies  o : fatigue, night sweats  · Eyes  o Denies  o : double vision, blurred vision  · HENT  o Denies  o : vertigo, recent head injury  · Breasts  o Denies  o : abnormal changes in breast size, additional breast symptoms except as noted in the HPI  · Cardiovascular  o Denies  o : chest pain, irregular heart beats  · Respiratory  o Denies  o : shortness of breath, productive cough  · Gastrointestinal  o Denies  o : nausea, vomiting  · Genitourinary  o Denies  o : dysuria, urinary retention  · Integument  o Denies  o : hair growth change, new skin lesions  · Neurologic  o Denies  o : altered mental status, seizures  · Musculoskeletal  o Denies  o : joint swelling, limitation of motion  · Endocrine  o Denies  o : cold intolerance, heat intolerance  · Heme-Lymph  o Denies  o : petechiae, lymph node enlargement or tenderness  · Allergic-Immunologic  o Denies  o : frequent illnesses      Physical  "Examination  · Mental Status Exam  o Appearance  o : good eye contact, normal street clothes, groomed   o Behavior  o : pleasant and cooperative  o Motor  o : no abnormal  o Speech  o : normal rhythm, rate, volume, tone, not hyperverbal, not pressured, normal prosidy  o Mood  o : \"I am doing good\"  o Affect  o : euthymic  o Thought Content  o : negative suicidal ideations, negative homicidal ideations, negative obsessions  o Perceptions  o : negative auditory hallucinations, negative visual hallucinations  o Thought Process  o : linear  o Insight/Judgement  o : fair/fair  o Cognition  o : grossly intact  o Attention  o : intact          Assessment  · Anxiety Disorder     300.00/F41.9  · Depressive Disorder     311/F32.9       Presentation still most consistent with anxiety, unspecified, in remission.  Some residual concentration issues.      Stable. Continue Escitalopram 10 mg p.o. daily.      Status post referral to [facility] for ADHD eval.     No interest in outside psychotherapy.    Follow-up in 3 months.    Status post Wellbutrin.  Status post Escitalopram 15 mg p.o. daily.       Plan  · Orders  o ACO-39: Current medications updated and reviewed (, 1159F) - - 03/12/2021  · Medications  o escitalopram oxalate 10 mg oral tablet   SIG: take 1 tablet (10 mg) by oral route once daily for 90 days   DISP: (90) Tablet with 2 refills  Refilled on 03/12/2021     o Medications have been Reconciled  o Transition of Care or Provider Policy  · Instructions  o Risk Assessment: Risk of self-harm acutely is low. Risk factors include anxiety disorder. Protective factors include her family, no suicide attempt in the past, no self-harm, no AODA, healthcare seeking, willingness to engage in care, future orientation. Risk of self-harm chronically is also low, but could be further elevated in the event of treatment noncompliance and/or AODA.  o Safety: No acute safety concerns.  o Medications: Continue escitalopram 10 mg p.o. " daily. Risks, benefits, alternatives discussed with patient including GI upset, nausea vomiting diarrhea, theoretical decrease of seizure threshold predisposing the patient to a slightly higher seizure risk, headaches, sexual dysfunction, serotonin syndrome. After discussion of these risks and benefits, the patient voiced understanding and agreed to proceed. ***S/P bupropion.  o Therapy: Status post referral to [facility] for ADHD eval.  o Labs/Studies:   o Follow Up: 3 month.  · Disposition  o Follow Up in 3 months.            Electronically Signed by: Allen Church MD -Author on March 12, 2021 11:02:36 AM

## 2021-05-15 VITALS
WEIGHT: 152.12 LBS | DIASTOLIC BLOOD PRESSURE: 72 MMHG | HEART RATE: 98 BPM | SYSTOLIC BLOOD PRESSURE: 126 MMHG | HEIGHT: 65 IN | OXYGEN SATURATION: 98 % | BODY MASS INDEX: 25.34 KG/M2 | TEMPERATURE: 98.1 F

## 2021-05-15 VITALS
SYSTOLIC BLOOD PRESSURE: 148 MMHG | HEIGHT: 65 IN | BODY MASS INDEX: 25.18 KG/M2 | HEART RATE: 99 BPM | DIASTOLIC BLOOD PRESSURE: 99 MMHG | OXYGEN SATURATION: 99 % | WEIGHT: 151.12 LBS

## 2021-05-23 ENCOUNTER — TRANSCRIBE ORDERS (OUTPATIENT)
Dept: ADMINISTRATIVE | Facility: HOSPITAL | Age: 41
End: 2021-05-23

## 2021-05-23 DIAGNOSIS — Z12.31 VISIT FOR SCREENING MAMMOGRAM: Primary | ICD-10-CM

## 2021-06-11 PROBLEM — F90.9 ATTENTION DEFICIT HYPERACTIVITY DISORDER (ADHD): Status: ACTIVE | Noted: 2021-06-11

## 2021-06-11 PROBLEM — F41.9 ANXIETY: Status: ACTIVE | Noted: 2020-11-18

## 2021-06-11 PROBLEM — F32.A DEPRESSION: Status: ACTIVE | Noted: 2021-06-11

## 2021-08-19 ENCOUNTER — HOSPITAL ENCOUNTER (OUTPATIENT)
Dept: MAMMOGRAPHY | Facility: HOSPITAL | Age: 41
Discharge: HOME | End: 2021-08-19
Admitting: NURSE PRACTITIONER

## 2021-08-19 DIAGNOSIS — Z12.31 VISIT FOR SCREENING MAMMOGRAM: ICD-10-CM

## 2021-08-19 PROCEDURE — 77067 SCR MAMMO BI INCL CAD: CPT

## 2021-08-19 PROCEDURE — 77063 BREAST TOMOSYNTHESIS BI: CPT

## 2021-08-20 ENCOUNTER — TELEPHONE (OUTPATIENT)
Dept: FAMILY MEDICINE CLINIC | Facility: CLINIC | Age: 41
End: 2021-08-20

## 2023-11-08 ENCOUNTER — OFFICE VISIT (OUTPATIENT)
Dept: FAMILY MEDICINE CLINIC | Facility: CLINIC | Age: 43
End: 2023-11-08
Payer: COMMERCIAL

## 2023-11-08 VITALS
HEART RATE: 80 BPM | OXYGEN SATURATION: 98 % | HEIGHT: 65 IN | WEIGHT: 151 LBS | SYSTOLIC BLOOD PRESSURE: 148 MMHG | DIASTOLIC BLOOD PRESSURE: 88 MMHG | BODY MASS INDEX: 25.16 KG/M2

## 2023-11-08 DIAGNOSIS — R03.0 ELEVATED BLOOD PRESSURE READING: ICD-10-CM

## 2023-11-08 DIAGNOSIS — F41.9 ANXIETY: ICD-10-CM

## 2023-11-08 DIAGNOSIS — Z00.00 ANNUAL PHYSICAL EXAM: Primary | ICD-10-CM

## 2023-11-08 DIAGNOSIS — Z13.220 SCREENING FOR CHOLESTEROL LEVEL: ICD-10-CM

## 2023-11-08 DIAGNOSIS — M25.511 ACUTE PAIN OF RIGHT SHOULDER: ICD-10-CM

## 2023-11-08 DIAGNOSIS — Z13.29 SCREENING FOR THYROID DISORDER: ICD-10-CM

## 2023-11-08 DIAGNOSIS — Z12.31 ENCOUNTER FOR SCREENING MAMMOGRAM FOR MALIGNANT NEOPLASM OF BREAST: ICD-10-CM

## 2023-11-08 RX ORDER — CLINDAMYCIN PHOSPHATE 10 UG/ML
LOTION TOPICAL
COMMUNITY
Start: 2023-09-25

## 2023-11-08 RX ORDER — BUSPIRONE HYDROCHLORIDE 7.5 MG/1
TABLET ORAL
Qty: 90 TABLET | Refills: 1 | Status: SHIPPED | OUTPATIENT
Start: 2023-11-08

## 2023-11-08 RX ORDER — TACROLIMUS 1 MG/G
OINTMENT TOPICAL
COMMUNITY
Start: 2023-09-22

## 2023-11-08 NOTE — PROGRESS NOTES
Chief Complaint  Annual Exam    Subjective            Judith Begum presents to [facility]  History of Present Illness  Pt is an annual CPE. 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 She has no known injury. She also complains if increased anxiety.  Lexapro made her sick to her stomach.  BP elevated in office today her   works for "[employer] dept and is gone for for 2 weeks for a forest fire.    Pt is due labs.    Pap: due, pt will make appt with Dr. Rae.     Mammogram: due, order placed.    Pt is due pcv20, tdap, and flu vaccines. Pt will get flu today. Pt declines others and understands the risks of not having.         Past Medical History:   Diagnosis Date    ADD (attention deficit disorder)     Anxiety     Depression        Allergies   Allergen Reactions    Latex Anaphylaxis        Past Surgical History:   Procedure Laterality Date    DILATATION AND CURETTAGE  2009    KNEE ACL RECONSTRUCTION  2011    THYROID SURGERY          Social History     Tobacco Use    Smoking status: Former     Types: Cigarettes    Smokeless tobacco: Never   Substance Use Topics    Alcohol use: Not on file       Family History   Problem Relation Age of Onset    Stroke Father     Lung cancer Father     Colon cancer Maternal Grandmother     Diabetes Maternal Grandmother     Lung cancer Paternal Grandfather     Breast cancer Other         Current Outpatient Medications on File Prior to Visit   Medication Sig    clindamycin (CLEOCIN T) 1 % lotion     Dupilumab (Dupixent) 300 MG/2ML solution prefilled syringe 2 mL.    tacrolimus (PROTOPIC) 0.1 % ointment     [DISCONTINUED] escitalopram (LEXAPRO) 10 MG tablet Take 10 mg by mouth Daily. (Patient not taking: Reported on 11/8/2023)     No current facility-administered medications on file prior to visit.       Health Maintenance Due   Topic Date Due    BMI FOLLOWUP  Never done    HEPATITIS C SCREENING   "Never done    INFLUENZA VACCINE  08/01/2023       Objective     /88   Pulse 80   Ht 165.1 cm (65\")   Wt 68.5 kg (151 lb)   SpO2 98%   BMI 25.13 kg/m²       Physical Exam  Constitutional:       General: She is not in acute distress.     Appearance: Normal appearance. She is not ill-appearing.   HENT:      Head: Normocephalic and atraumatic.      Right Ear: Tympanic membrane, ear canal and external ear normal.      Left Ear: Tympanic membrane, ear canal and external ear normal.      Nose: Nose normal.   Cardiovascular:      Rate and Rhythm: Normal rate and regular rhythm.      Heart sounds: Normal heart sounds. No murmur heard.  Pulmonary:      Effort: Pulmonary effort is normal. No respiratory distress.      Breath sounds: Normal breath sounds.   Chest:      Chest wall: No tenderness.   Abdominal:      General: Abdomen is flat. Bowel sounds are normal. There is no distension.      Palpations: Abdomen is soft. There is no mass.      Tenderness: There is no abdominal tenderness. There is no guarding.   Musculoskeletal:         General: No swelling or tenderness. Normal range of motion.      Right shoulder: Decreased range of motion.      Cervical back: Normal range of motion and neck supple.   Skin:     General: Skin is warm and dry.      Findings: No rash.   Neurological:      General: No focal deficit present.      Mental Status: She is alert and oriented to person, place, and time. Mental status is at baseline.      Gait: Gait normal.   Psychiatric:         Attention and Perception: Attention normal.         Mood and Affect: Mood normal.         Speech: Speech normal.         Behavior: Behavior normal. Behavior is cooperative.         Thought Content: Thought content normal. Thought content does not include suicidal ideation.         Cognition and Memory: Cognition normal.         Judgment: Judgment normal.           Result Review :                           Assessment and Plan        Diagnoses and all " orders for this visit:    1. Annual physical exam (Primary)  -     Hepatitis C antibody; Future  -     CBC w AUTO Differential; Future  -     Comprehensive metabolic panel; Future  -     Lipid panel; Future  -     TSH; Future  -     Fluzone (or Fluarix & Flulaval for VFC) >6 Mos (4386-1516)    2. Screening for cholesterol level  -     Comprehensive metabolic panel; Future  -     Lipid panel; Future    3. Screening for thyroid disorder  -     TSH; Future    4. Encounter for screening mammogram for malignant neoplasm of breast  -     Mammo Screening Digital Tomosynthesis Bilateral With CAD; Future  -     Fluzone (or Fluarix & Flulaval for VFC) >6 Mos (3584-3646)    5. Elevated blood pressure reading  Comments:  advised pt to keep a  bp log if > 140/80s RTC for mgmt    6. Anxiety  -     busPIRone (BUSPAR) 7.5 MG tablet; TID prn  Dispense: 90 tablet; Refill: 1    7. Acute pain of right shoulder  -     MRI Shoulder Right Without Contrast; Future      Preventative Counseling:  Healthy diet  Daily exercise  Get adequate sleep        Follow Up     Return in about 1 year (around 11/8/2024) for Annual physical.    Patient was given instructions and counseling regarding her condition or for health maintenance advice. Please see specific information pulled into the AVS if appropriate.     Judith Begum  reports that she has quit smoking. Her smoking use included cigarettes. She has never used smokeless tobacco..

## 2023-12-11 ENCOUNTER — LAB (OUTPATIENT)
Dept: LAB | Facility: HOSPITAL | Age: 43
End: 2023-12-11
Payer: COMMERCIAL

## 2023-12-11 DIAGNOSIS — Z00.00 ANNUAL PHYSICAL EXAM: ICD-10-CM

## 2023-12-11 DIAGNOSIS — Z13.29 SCREENING FOR THYROID DISORDER: ICD-10-CM

## 2023-12-11 DIAGNOSIS — Z13.220 SCREENING FOR CHOLESTEROL LEVEL: ICD-10-CM

## 2023-12-11 LAB
ALBUMIN SERPL-MCNC: 4.4 G/DL (ref 3.5–5.2)
ALBUMIN/GLOB SERPL: 2 G/DL
ALP SERPL-CCNC: 61 U/L (ref 39–117)
ALT SERPL W P-5'-P-CCNC: 19 U/L (ref 1–33)
ANION GAP SERPL CALCULATED.3IONS-SCNC: 7 MMOL/L (ref 5–15)
AST SERPL-CCNC: 19 U/L (ref 1–32)
BASOPHILS # BLD AUTO: 0.06 10*3/MM3 (ref 0–0.2)
BASOPHILS NFR BLD AUTO: 0.8 % (ref 0–1.5)
BILIRUB SERPL-MCNC: 0.5 MG/DL (ref 0–1.2)
BUN SERPL-MCNC: 15 MG/DL (ref 6–20)
BUN/CREAT SERPL: 19.7 (ref 7–25)
CALCIUM SPEC-SCNC: 9.3 MG/DL (ref 8.6–10.5)
CHLORIDE SERPL-SCNC: 105 MMOL/L (ref 98–107)
CHOLEST SERPL-MCNC: 165 MG/DL (ref 0–200)
CO2 SERPL-SCNC: 26 MMOL/L (ref 22–29)
CREAT SERPL-MCNC: 0.76 MG/DL (ref 0.57–1)
DEPRECATED RDW RBC AUTO: 41.4 FL (ref 37–54)
EGFRCR SERPLBLD CKD-EPI 2021: 99.2 ML/MIN/1.73
EOSINOPHIL # BLD AUTO: 0.08 10*3/MM3 (ref 0–0.4)
EOSINOPHIL NFR BLD AUTO: 1.1 % (ref 0.3–6.2)
ERYTHROCYTE [DISTWIDTH] IN BLOOD BY AUTOMATED COUNT: 11.9 % (ref 12.3–15.4)
GLOBULIN UR ELPH-MCNC: 2.2 GM/DL
GLUCOSE SERPL-MCNC: 95 MG/DL (ref 65–99)
HCT VFR BLD AUTO: 39.4 % (ref 34–46.6)
HCV AB SER DONR QL: NORMAL
HDLC SERPL-MCNC: 39 MG/DL (ref 40–60)
HGB BLD-MCNC: 13 G/DL (ref 12–15.9)
IMM GRANULOCYTES # BLD AUTO: 0.03 10*3/MM3 (ref 0–0.05)
IMM GRANULOCYTES NFR BLD AUTO: 0.4 % (ref 0–0.5)
LDLC SERPL CALC-MCNC: 114 MG/DL (ref 0–100)
LDLC/HDLC SERPL: 2.91 {RATIO}
LYMPHOCYTES # BLD AUTO: 1.84 10*3/MM3 (ref 0.7–3.1)
LYMPHOCYTES NFR BLD AUTO: 24.9 % (ref 19.6–45.3)
MCH RBC QN AUTO: 31.3 PG (ref 26.6–33)
MCHC RBC AUTO-ENTMCNC: 33 G/DL (ref 31.5–35.7)
MCV RBC AUTO: 94.7 FL (ref 79–97)
MONOCYTES # BLD AUTO: 0.47 10*3/MM3 (ref 0.1–0.9)
MONOCYTES NFR BLD AUTO: 6.4 % (ref 5–12)
NEUTROPHILS NFR BLD AUTO: 4.9 10*3/MM3 (ref 1.7–7)
NEUTROPHILS NFR BLD AUTO: 66.4 % (ref 42.7–76)
NRBC BLD AUTO-RTO: 0 /100 WBC (ref 0–0.2)
PLATELET # BLD AUTO: 295 10*3/MM3 (ref 140–450)
PMV BLD AUTO: 11 FL (ref 6–12)
POTASSIUM SERPL-SCNC: 4.4 MMOL/L (ref 3.5–5.2)
PROT SERPL-MCNC: 6.6 G/DL (ref 6–8.5)
RBC # BLD AUTO: 4.16 10*6/MM3 (ref 3.77–5.28)
SODIUM SERPL-SCNC: 138 MMOL/L (ref 136–145)
TRIGL SERPL-MCNC: 62 MG/DL (ref 0–150)
TSH SERPL DL<=0.05 MIU/L-ACNC: 1.93 UIU/ML (ref 0.27–4.2)
VLDLC SERPL-MCNC: 12 MG/DL (ref 5–40)
WBC NRBC COR # BLD AUTO: 7.38 10*3/MM3 (ref 3.4–10.8)

## 2023-12-11 PROCEDURE — 80050 GENERAL HEALTH PANEL: CPT

## 2023-12-11 PROCEDURE — 36415 COLL VENOUS BLD VENIPUNCTURE: CPT

## 2023-12-11 PROCEDURE — 86803 HEPATITIS C AB TEST: CPT

## 2023-12-11 PROCEDURE — 80061 LIPID PANEL: CPT

## 2023-12-12 ENCOUNTER — TELEMEDICINE (OUTPATIENT)
Dept: FAMILY MEDICINE CLINIC | Facility: CLINIC | Age: 43
End: 2023-12-12
Payer: COMMERCIAL

## 2023-12-12 DIAGNOSIS — J01.91 ACUTE RECURRENT SINUSITIS, UNSPECIFIED LOCATION: ICD-10-CM

## 2023-12-12 DIAGNOSIS — I10 HYPERTENSION, UNSPECIFIED TYPE: ICD-10-CM

## 2023-12-12 DIAGNOSIS — F41.9 ANXIETY: Primary | ICD-10-CM

## 2023-12-12 DIAGNOSIS — M25.511 ACUTE PAIN OF RIGHT SHOULDER: ICD-10-CM

## 2023-12-12 PROCEDURE — 99214 OFFICE O/P EST MOD 30 MIN: CPT | Performed by: NURSE PRACTITIONER

## 2023-12-12 RX ORDER — AZITHROMYCIN 250 MG/1
TABLET, FILM COATED ORAL
Qty: 6 TABLET | Refills: 0 | Status: SHIPPED | OUTPATIENT
Start: 2023-12-12

## 2023-12-12 RX ORDER — LISINOPRIL AND HYDROCHLOROTHIAZIDE 12.5; 1 MG/1; MG/1
1 TABLET ORAL DAILY
Qty: 30 TABLET | Refills: 1 | Status: SHIPPED | OUTPATIENT
Start: 2023-12-12

## 2023-12-12 NOTE — PROGRESS NOTES
Fax my last two notes, labs and ultrasound to her PCP You have chosen to receive care through a telehealth visit.  Do you consent to use a video/audio connection for your medical care today? Yes    Mode of visit: video    Location of patient: [facility]    Location of provider: [facility]       Chief Complaint  Shoulder Pain, Cough, Fatigue, and Sinus Problem, HTN    Subjective            Judith Margot Begum presents to [facility]  History of Present Illness  Pt is a 1 mo f/u for anxiety. Pt was started on Buspar 7.5 mg. Pt states this works okay for her but does cause some nausea. She would like to continue taking it as she has not really taken it much to see if it helps.    Pt has c/o body aches, cough, nasal congestion, and fatigue. Pt states her s/s started 1 day ago. Pt took at home covid test and was negative. Pt has been taking ibuprofen with little relief.    An MRI was ordered for pt at [facility] for (R) shoulder pain, loss of ROM. Insurance denied MRI. PT requested. Order placed.     PT reports she has been traking her BP and  it has been running 150/100s, she  has also had some headaches and thinks she needs to start bp medication.        Past Medical History:   Diagnosis Date    ADD (attention deficit disorder)     Anxiety     Depression        Allergies   Allergen Reactions    Latex Anaphylaxis        Past Surgical History:   Procedure Laterality Date    DILATATION AND CURETTAGE  2009    KNEE ACL RECONSTRUCTION  2011    THYROID SURGERY          Social History     Tobacco Use    Smoking status: Former     Types: Cigarettes    Smokeless tobacco: Never   Substance Use Topics    Alcohol use: Not on file       Family History   Problem Relation Age of Onset    Stroke Father     Lung cancer Father     Colon cancer Maternal Grandmother     Diabetes Maternal Grandmother     Lung cancer Paternal Grandfather     Breast cancer Other         Current Outpatient Medications on File Prior to Visit   Medication Sig    busPIRone  (BUSPAR) 7.5 MG tablet TID prn    clindamycin (CLEOCIN T) 1 % lotion     Dupilumab (Dupixent) 300 MG/2ML solution prefilled syringe 2 mL.    tacrolimus (PROTOPIC) 0.1 % ointment      No current facility-administered medications on file prior to visit.       Health Maintenance Due   Topic Date Due    TDAP/TD VACCINES (1 - Tdap) Never done    COVID-19 Vaccine (3 - 2023-24 season) 09/01/2023       Objective     There were no vitals taken for this visit.      Physical Exam  Neurological:      Mental Status: She is alert.   Psychiatric:         Attention and Perception: Attention normal.         Mood and Affect: Mood and affect normal.         Speech: Speech normal.         Behavior: Behavior normal. Behavior is cooperative.         Thought Content: Thought content normal. Thought content does not include suicidal ideation.           Result Review :                           Assessment and Plan        Diagnoses and all orders for this visit:    1. Anxiety (Primary)  Comments:  stable on buspar 7.5mg, continue    2. Acute pain of right shoulder  -     Ambulatory Referral to Physical Therapy    3. Acute recurrent sinusitis, unspecified location  -     azithromycin (Zithromax Z-Jamie) 250 MG tablet; Take 2 tablets by mouth on day 1, then 1 tablet daily on days 2-5  Dispense: 6 tablet; Refill: 0    4. Hypertension, unspecified type  Comments:  advised pt to keep a blood  pressure log and bring to f/u appt  Orders:  -     lisinopril-hydrochlorothiazide (Zestoretic) 10-12.5 MG per tablet; Take 1 tablet by mouth Daily.  Dispense: 30 tablet; Refill: 1              Follow Up     Return in about 1 month (around 1/12/2024).    Patient was given instructions and counseling regarding her condition or for health maintenance advice. Please see specific information pulled into the AVS if appropriate.     Judith Begum  reports that she has quit smoking. Her smoking use included cigarettes. She has never used smokeless  tobacco..

## 2023-12-13 ENCOUNTER — PATIENT MESSAGE (OUTPATIENT)
Dept: FAMILY MEDICINE CLINIC | Facility: CLINIC | Age: 43
End: 2023-12-13
Payer: COMMERCIAL

## 2023-12-27 ENCOUNTER — PATIENT MESSAGE (OUTPATIENT)
Dept: FAMILY MEDICINE CLINIC | Facility: CLINIC | Age: 43
End: 2023-12-27
Payer: COMMERCIAL

## 2024-01-22 ENCOUNTER — TELEPHONE (OUTPATIENT)
Dept: FAMILY MEDICINE CLINIC | Facility: CLINIC | Age: 44
End: 2024-01-22
Payer: COMMERCIAL

## 2024-01-22 NOTE — TELEPHONE ENCOUNTER
Caller: Judith Begum    Relationship to patient: Self    Best call back number: [phone]     Patient is needing: PATIENT CALLED STATING SHE CALLED [facility] TO SCHEDULE HER PHYSICAL THERAPY APPOINTMENT AND IT IS SET FOR   1.30.24 PATIENT ASKS FOR THE REFERRAL PAPERS TO BE   FAXED TO  [phone] THE OFFICE STATES THEY DO NOT HAVE THE PAPERS

## 2024-02-06 ENCOUNTER — OFFICE VISIT (OUTPATIENT)
Dept: FAMILY MEDICINE CLINIC | Facility: CLINIC | Age: 44
End: 2024-02-06
Payer: COMMERCIAL

## 2024-02-06 VITALS
OXYGEN SATURATION: 100 % | DIASTOLIC BLOOD PRESSURE: 84 MMHG | HEART RATE: 62 BPM | WEIGHT: 155 LBS | SYSTOLIC BLOOD PRESSURE: 120 MMHG | HEIGHT: 65 IN | BODY MASS INDEX: 25.83 KG/M2

## 2024-02-06 DIAGNOSIS — F41.9 ANXIETY: ICD-10-CM

## 2024-02-06 DIAGNOSIS — Z12.11 SCREENING FOR COLON CANCER: ICD-10-CM

## 2024-02-06 DIAGNOSIS — I10 HYPERTENSION, UNSPECIFIED TYPE: ICD-10-CM

## 2024-02-06 DIAGNOSIS — I10 PRIMARY HYPERTENSION: Primary | ICD-10-CM

## 2024-02-06 RX ORDER — LISINOPRIL AND HYDROCHLOROTHIAZIDE 12.5; 1 MG/1; MG/1
1 TABLET ORAL DAILY
Qty: 30 TABLET | Refills: 1 | OUTPATIENT
Start: 2024-02-06

## 2024-02-06 RX ORDER — LISINOPRIL AND HYDROCHLOROTHIAZIDE 12.5; 1 MG/1; MG/1
1 TABLET ORAL DAILY
Qty: 90 TABLET | Refills: 1 | Status: SHIPPED | OUTPATIENT
Start: 2024-02-06

## 2024-02-06 NOTE — PROGRESS NOTES
Chief Complaint  Hypertension, anxiety    Subjective            Judith Margot Begum presents to [facility]  History of Present Illness  Pt is a 1 mo f/u for HTN. Pt was started on Zestoretic 10-12.5 mg QD. Pt reports BP at home runs 120's/70-80's. No issues or concerns at this time.     Pt is due pap and will schedule with Dr. Rae    Pt is due Tdap vaccine. PT declines and understands the risks of not having.    Pt is due colon screening 7/30/2024.  Will place orders.         Past Medical History:   Diagnosis Date    ADD (attention deficit disorder)     Anxiety     Depression        Allergies   Allergen Reactions    Latex Anaphylaxis        Past Surgical History:   Procedure Laterality Date    DILATATION AND CURETTAGE  2009    KNEE ACL RECONSTRUCTION  2011    THYROID SURGERY          Social History     Tobacco Use    Smoking status: Former     Types: Cigarettes    Smokeless tobacco: Never   Substance Use Topics    Alcohol use: Not on file       Family History   Problem Relation Age of Onset    Stroke Father     Lung cancer Father     Colon cancer Maternal Grandmother     Diabetes Maternal Grandmother     Lung cancer Paternal Grandfather     Breast cancer Other         Current Outpatient Medications on File Prior to Visit   Medication Sig    busPIRone (BUSPAR) 7.5 MG tablet TID prn    clindamycin (CLEOCIN T) 1 % lotion     Dupilumab (Dupixent) 300 MG/2ML solution prefilled syringe 2 mL.    tacrolimus (PROTOPIC) 0.1 % ointment     [DISCONTINUED] lisinopril-hydrochlorothiazide (Zestoretic) 10-12.5 MG per tablet Take 1 tablet by mouth Daily.    [DISCONTINUED] azithromycin (Zithromax Z-Jamie) 250 MG tablet Take 2 tablets by mouth on day 1, then 1 tablet daily on days 2-5 (Patient not taking: Reported on 2/6/2024)     No current facility-administered medications on file prior to visit.       There are no preventive care reminders to display for this patient.      Objective     BP  "120/84   Pulse 62   Ht 165.1 cm (65\")   Wt 70.3 kg (155 lb)   SpO2 100%   BMI 25.79 kg/m²       Physical Exam  Constitutional:       General: She is not in acute distress.     Appearance: Normal appearance. She is not ill-appearing.   HENT:      Head: Normocephalic and atraumatic.   Cardiovascular:      Rate and Rhythm: Normal rate and regular rhythm.      Heart sounds: Normal heart sounds. No murmur heard.  Pulmonary:      Effort: Pulmonary effort is normal. No respiratory distress.      Breath sounds: Normal breath sounds.   Chest:      Chest wall: No tenderness.   Musculoskeletal:         General: No swelling or tenderness. Normal range of motion.      Cervical back: Normal range of motion and neck supple.   Skin:     General: Skin is warm and dry.      Findings: No rash.   Neurological:      General: No focal deficit present.      Mental Status: She is alert and oriented to person, place, and time. Mental status is at baseline.      Gait: Gait normal.   Psychiatric:         Attention and Perception: Attention normal.         Mood and Affect: Mood and affect normal.         Speech: Speech normal.         Behavior: Behavior normal. Behavior is cooperative.         Thought Content: Thought content normal. Thought content does not include suicidal ideation.         Judgment: Judgment normal.           Result Review :                           Assessment and Plan        Diagnoses and all orders for this visit:    1. Primary hypertension (Primary)  Comments:  stable on zestoretic 10/12.5mg, continue  Orders:  -     lisinopril-hydrochlorothiazide (Zestoretic) 10-12.5 MG per tablet; Take 1 tablet by mouth Daily.  Dispense: 90 tablet; Refill: 1    2. Anxiety  Comments:  stable on buspar 7.5mg, continue    3. Screening for colon cancer  -     Ambulatory Referral For Screening Colonoscopy              Follow Up     Return in about 6 months (around 8/6/2024).    Patient was given instructions and counseling regarding " her condition or for health maintenance advice. Please see specific information pulled into the AVS if appropriate.     Judith Rothmanelle Kel  reports that she has quit smoking. Her smoking use included cigarettes. She has never used smokeless tobacco..

## 2024-02-20 ENCOUNTER — HOSPITAL ENCOUNTER (OUTPATIENT)
Dept: MAMMOGRAPHY | Facility: HOSPITAL | Age: 44
Discharge: HOME | End: 2024-02-20
Admitting: NURSE PRACTITIONER
Payer: COMMERCIAL

## 2024-02-20 DIAGNOSIS — Z12.31 ENCOUNTER FOR SCREENING MAMMOGRAM FOR MALIGNANT NEOPLASM OF BREAST: ICD-10-CM

## 2024-02-20 PROCEDURE — 77063 BREAST TOMOSYNTHESIS BI: CPT

## 2024-02-20 PROCEDURE — 77067 SCR MAMMO BI INCL CAD: CPT

## 2024-08-08 ENCOUNTER — OFFICE VISIT (OUTPATIENT)
Dept: FAMILY MEDICINE CLINIC | Facility: CLINIC | Age: 44
End: 2024-08-08
Payer: COMMERCIAL

## 2024-08-08 VITALS
BODY MASS INDEX: 25.83 KG/M2 | DIASTOLIC BLOOD PRESSURE: 84 MMHG | HEART RATE: 68 BPM | OXYGEN SATURATION: 100 % | SYSTOLIC BLOOD PRESSURE: 125 MMHG | HEIGHT: 65 IN | WEIGHT: 155 LBS

## 2024-08-08 DIAGNOSIS — I10 PRIMARY HYPERTENSION: ICD-10-CM

## 2024-08-08 DIAGNOSIS — Z13.29 SCREENING FOR THYROID DISORDER: ICD-10-CM

## 2024-08-08 DIAGNOSIS — F41.9 ANXIETY: Primary | ICD-10-CM

## 2024-08-08 DIAGNOSIS — Z13.220 SCREENING FOR CHOLESTEROL LEVEL: ICD-10-CM

## 2024-08-08 PROCEDURE — 99214 OFFICE O/P EST MOD 30 MIN: CPT | Performed by: NURSE PRACTITIONER

## 2024-08-08 RX ORDER — LISINOPRIL AND HYDROCHLOROTHIAZIDE 12.5; 1 MG/1; MG/1
1 TABLET ORAL DAILY
Qty: 90 TABLET | Refills: 1 | Status: SHIPPED | OUTPATIENT
Start: 2024-08-08

## 2024-08-08 NOTE — PROGRESS NOTES
"Chief Complaint  Anxiety and Hypertension    Subjective            Judith Begum presents to [facility]  History of Present Illness  Pt is a f/u for anxiety and HTN. No issues or concerns at this time.     Pt is due labs/orders placed.    Pap: due. Pt states she will schedule with Dr. Rae.         Past Medical History:   Diagnosis Date    ADD (attention deficit disorder)     Anxiety     Depression        Allergies   Allergen Reactions    Latex Anaphylaxis        Past Surgical History:   Procedure Laterality Date    DILATATION AND CURETTAGE  2009    KNEE ACL RECONSTRUCTION  2011    THYROID SURGERY          Social History     Tobacco Use    Smoking status: Former     Types: Cigarettes    Smokeless tobacco: Never   Substance Use Topics    Alcohol use: Not on file       Family History   Problem Relation Age of Onset    Stroke Father     Lung cancer Father     Colon cancer Maternal Grandmother     Diabetes Maternal Grandmother     Lung cancer Paternal Grandfather     Breast cancer Other         Current Outpatient Medications on File Prior to Visit   Medication Sig    busPIRone (BUSPAR) 7.5 MG tablet TID prn    clindamycin (CLEOCIN T) 1 % lotion     Dupilumab (Dupixent) 300 MG/2ML solution prefilled syringe 2 mL.    tacrolimus (PROTOPIC) 0.1 % ointment     [DISCONTINUED] lisinopril-hydrochlorothiazide (Zestoretic) 10-12.5 MG per tablet Take 1 tablet by mouth Daily.     No current facility-administered medications on file prior to visit.       Health Maintenance Due   Topic Date Due    COLORECTAL CANCER SCREENING  Never done       Objective     /84   Pulse 68   Ht 165.1 cm (65\")   Wt 70.3 kg (155 lb)   SpO2 100%   BMI 25.79 kg/m²       Physical Exam  Constitutional:       General: She is not in acute distress.     Appearance: Normal appearance. She is not ill-appearing.   HENT:      Head: Normocephalic and atraumatic.   Cardiovascular:      Rate and Rhythm: Normal " rate and regular rhythm.      Heart sounds: Normal heart sounds. No murmur heard.  Pulmonary:      Effort: Pulmonary effort is normal. No respiratory distress.      Breath sounds: Normal breath sounds.   Chest:      Chest wall: No tenderness.   Musculoskeletal:         General: No swelling or tenderness. Normal range of motion.      Cervical back: Normal range of motion and neck supple.   Skin:     General: Skin is warm and dry.      Findings: No rash.   Neurological:      General: No focal deficit present.      Mental Status: She is alert and oriented to person, place, and time. Mental status is at baseline.      Gait: Gait normal.   Psychiatric:         Mood and Affect: Mood normal.         Behavior: Behavior normal.         Thought Content: Thought content normal.         Judgment: Judgment normal.           Result Review :                           Assessment and Plan        Diagnoses and all orders for this visit:    1. Anxiety (Primary)  Comments:  stable on Buspar 7.5mg prn, continue    2. Screening for cholesterol level  -     Comprehensive metabolic panel; Future  -     Lipid panel; Future    3. Screening for thyroid disorder  -     TSH; Future    4. Primary hypertension  Comments:  stable on zestoretic 10/12.5mg, continue  Orders:  -     CBC w AUTO Differential; Future  -     lisinopril-hydrochlorothiazide (Zestoretic) 10-12.5 MG per tablet; Take 1 tablet by mouth Daily.  Dispense: 90 tablet; Refill: 1              Follow Up     Return in about 6 months (around 2/8/2025).    Patient was given instructions and counseling regarding her condition or for health maintenance advice. Please see specific information pulled into the AVS if appropriate.     Judith Frost Begum  reports that she has quit smoking. Her smoking use included cigarettes. She has never used smokeless tobacco.

## 2024-08-20 DIAGNOSIS — I10 PRIMARY HYPERTENSION: ICD-10-CM

## 2024-08-20 RX ORDER — LISINOPRIL AND HYDROCHLOROTHIAZIDE 12.5; 1 MG/1; MG/1
1 TABLET ORAL DAILY
Qty: 90 TABLET | Refills: 1 | OUTPATIENT
Start: 2024-08-20

## 2024-09-27 ENCOUNTER — HOSPITAL ENCOUNTER (EMERGENCY)
Facility: HOSPITAL | Age: 44
Discharge: HOME | End: 2024-09-27
Attending: EMERGENCY MEDICINE
Payer: COMMERCIAL

## 2024-09-27 ENCOUNTER — TELEPHONE (OUTPATIENT)
Dept: FAMILY MEDICINE CLINIC | Facility: CLINIC | Age: 44
End: 2024-09-27
Payer: COMMERCIAL

## 2024-09-27 ENCOUNTER — APPOINTMENT (OUTPATIENT)
Dept: CT IMAGING | Facility: HOSPITAL | Age: 44
End: 2024-09-27
Payer: COMMERCIAL

## 2024-09-27 ENCOUNTER — APPOINTMENT (OUTPATIENT)
Dept: GENERAL RADIOLOGY | Facility: HOSPITAL | Age: 44
End: 2024-09-27
Payer: COMMERCIAL

## 2024-09-27 VITALS
DIASTOLIC BLOOD PRESSURE: 92 MMHG | TEMPERATURE: 98.3 F | WEIGHT: 152.56 LBS | OXYGEN SATURATION: 98 % | RESPIRATION RATE: 16 BRPM | BODY MASS INDEX: 25.42 KG/M2 | HEIGHT: 65 IN | HEART RATE: 92 BPM | SYSTOLIC BLOOD PRESSURE: 142 MMHG

## 2024-09-27 DIAGNOSIS — R42 VERTIGO: Primary | ICD-10-CM

## 2024-09-27 DIAGNOSIS — I10 HYPERTENSION, UNSPECIFIED TYPE: ICD-10-CM

## 2024-09-27 LAB
ALBUMIN SERPL-MCNC: 4.3 G/DL (ref 3.5–5.2)
ALBUMIN/GLOB SERPL: 1.4 G/DL
ALP SERPL-CCNC: 83 U/L (ref 39–117)
ALT SERPL W P-5'-P-CCNC: 15 U/L (ref 1–33)
ANION GAP SERPL CALCULATED.3IONS-SCNC: 11.3 MMOL/L (ref 5–15)
AST SERPL-CCNC: 22 U/L (ref 1–32)
BASOPHILS # BLD AUTO: 0.04 10*3/MM3 (ref 0–0.2)
BASOPHILS NFR BLD AUTO: 0.4 % (ref 0–1.5)
BILIRUB SERPL-MCNC: 0.6 MG/DL (ref 0–1.2)
BILIRUB UR QL STRIP: NEGATIVE
BUN SERPL-MCNC: 10 MG/DL (ref 6–20)
BUN/CREAT SERPL: 12.8 (ref 7–25)
CALCIUM SPEC-SCNC: 9.9 MG/DL (ref 8.6–10.5)
CHLORIDE SERPL-SCNC: 97 MMOL/L (ref 98–107)
CLARITY UR: CLEAR
CO2 SERPL-SCNC: 25.7 MMOL/L (ref 22–29)
COLOR UR: YELLOW
CREAT SERPL-MCNC: 0.78 MG/DL (ref 0.57–1)
DEPRECATED RDW RBC AUTO: 42.4 FL (ref 37–54)
EGFRCR SERPLBLD CKD-EPI 2021: 95.6 ML/MIN/1.73
EOSINOPHIL # BLD AUTO: 0.07 10*3/MM3 (ref 0–0.4)
EOSINOPHIL NFR BLD AUTO: 0.7 % (ref 0.3–6.2)
ERYTHROCYTE [DISTWIDTH] IN BLOOD BY AUTOMATED COUNT: 12.1 % (ref 12.3–15.4)
GLOBULIN UR ELPH-MCNC: 3.1 GM/DL
GLUCOSE SERPL-MCNC: 120 MG/DL (ref 65–99)
GLUCOSE UR STRIP-MCNC: NEGATIVE MG/DL
HCG INTACT+B SERPL-ACNC: <0.5 MIU/ML
HCT VFR BLD AUTO: 40.3 % (ref 34–46.6)
HGB BLD-MCNC: 13.9 G/DL (ref 12–15.9)
HGB UR QL STRIP.AUTO: NEGATIVE
HOLD SPECIMEN: NORMAL
HOLD SPECIMEN: NORMAL
IMM GRANULOCYTES # BLD AUTO: 0.04 10*3/MM3 (ref 0–0.05)
IMM GRANULOCYTES NFR BLD AUTO: 0.4 % (ref 0–0.5)
KETONES UR QL STRIP: NEGATIVE
LEUKOCYTE ESTERASE UR QL STRIP.AUTO: NEGATIVE
LYMPHOCYTES # BLD AUTO: 2.3 10*3/MM3 (ref 0.7–3.1)
LYMPHOCYTES NFR BLD AUTO: 24.6 % (ref 19.6–45.3)
MAGNESIUM SERPL-MCNC: 2 MG/DL (ref 1.6–2.6)
MCH RBC QN AUTO: 32.9 PG (ref 26.6–33)
MCHC RBC AUTO-ENTMCNC: 34.5 G/DL (ref 31.5–35.7)
MCV RBC AUTO: 95.5 FL (ref 79–97)
MONOCYTES # BLD AUTO: 0.58 10*3/MM3 (ref 0.1–0.9)
MONOCYTES NFR BLD AUTO: 6.2 % (ref 5–12)
NEUTROPHILS NFR BLD AUTO: 6.33 10*3/MM3 (ref 1.7–7)
NEUTROPHILS NFR BLD AUTO: 67.7 % (ref 42.7–76)
NITRITE UR QL STRIP: NEGATIVE
NRBC BLD AUTO-RTO: 0 /100 WBC (ref 0–0.2)
PH UR STRIP.AUTO: 8 [PH] (ref 5–8)
PLATELET # BLD AUTO: 285 10*3/MM3 (ref 140–450)
PMV BLD AUTO: 10.7 FL (ref 6–12)
POTASSIUM SERPL-SCNC: 3.9 MMOL/L (ref 3.5–5.2)
PROT SERPL-MCNC: 7.4 G/DL (ref 6–8.5)
PROT UR QL STRIP: NEGATIVE
QT INTERVAL: 358 MS
QTC INTERVAL: 414 MS
RBC # BLD AUTO: 4.22 10*6/MM3 (ref 3.77–5.28)
SODIUM SERPL-SCNC: 134 MMOL/L (ref 136–145)
SP GR UR STRIP: 1.01 (ref 1–1.03)
TROPONIN T SERPL HS-MCNC: <6 NG/L
UROBILINOGEN UR QL STRIP: NORMAL
WBC NRBC COR # BLD AUTO: 9.36 10*3/MM3 (ref 3.4–10.8)
WHOLE BLOOD HOLD COAG: NORMAL
WHOLE BLOOD HOLD SPECIMEN: NORMAL

## 2024-09-27 PROCEDURE — 85025 COMPLETE CBC W/AUTO DIFF WBC: CPT

## 2024-09-27 PROCEDURE — 84702 CHORIONIC GONADOTROPIN TEST: CPT

## 2024-09-27 PROCEDURE — 70450 CT HEAD/BRAIN W/O DYE: CPT

## 2024-09-27 PROCEDURE — 93005 ELECTROCARDIOGRAM TRACING: CPT | Performed by: EMERGENCY MEDICINE

## 2024-09-27 PROCEDURE — 99284 EMERGENCY DEPT VISIT MOD MDM: CPT

## 2024-09-27 PROCEDURE — 84484 ASSAY OF TROPONIN QUANT: CPT

## 2024-09-27 PROCEDURE — 81003 URINALYSIS AUTO W/O SCOPE: CPT

## 2024-09-27 PROCEDURE — 83735 ASSAY OF MAGNESIUM: CPT

## 2024-09-27 PROCEDURE — 93010 ELECTROCARDIOGRAM REPORT: CPT | Performed by: INTERNAL MEDICINE

## 2024-09-27 PROCEDURE — 80053 COMPREHEN METABOLIC PANEL: CPT

## 2024-09-27 PROCEDURE — 36415 COLL VENOUS BLD VENIPUNCTURE: CPT

## 2024-09-27 PROCEDURE — 93005 ELECTROCARDIOGRAM TRACING: CPT

## 2024-09-27 PROCEDURE — 71045 X-RAY EXAM CHEST 1 VIEW: CPT

## 2024-09-27 RX ORDER — SODIUM CHLORIDE 0.9 % (FLUSH) 0.9 %
10 SYRINGE (ML) INJECTION AS NEEDED
Status: DISCONTINUED | OUTPATIENT
Start: 2024-09-27 | End: 2024-09-27 | Stop reason: HOSPADM

## 2024-09-27 RX ORDER — MECLIZINE HYDROCHLORIDE 25 MG/1
25 TABLET ORAL ONCE
Status: COMPLETED | OUTPATIENT
Start: 2024-09-27 | End: 2024-09-27

## 2024-09-27 RX ORDER — MECLIZINE HYDROCHLORIDE 25 MG/1
50 TABLET ORAL 3 TIMES DAILY PRN
Qty: 20 TABLET | Refills: 0 | Status: SHIPPED | OUTPATIENT
Start: 2024-09-27

## 2024-09-27 RX ADMIN — MECLIZINE HYDROCHLORIDE 25 MG: 25 TABLET ORAL at 14:00

## 2024-09-27 NOTE — ED PROVIDER NOTES
Time: 1:04 PM EDT  Date of encounter:  9/27/2024  Independent Historian/Clinical History and Information was obtained by:   Patient    History is limited by: N/A    Chief Complaint: Dizziness     History of Present Illness:  Patient is a 44 y.o. year old female who presents to the emergency department for evaluation of dizziness.  Patient complaining of dizziness that began yesterday.  She states that is intermittent.  She has not noticed any specific trigger, states that it occurs when she is standing and lying.  She states this morning her blood pressure was elevated to 151/110.  She states that she had already taken her blood pressure medication at that time so she took a second dose.  Patient describes the dizziness as lightheadedness.  She denies headache, blurry vision, syncope, chest pain, shortness of breath, neck pain, head injury.  She denies any history of vertigo.  She does states she has had some associated nausea.  Patient's  states that they do have 3 kids and she has been under more stress recently.  He stated that patient had not been taking her blood pressure medication regularly, however patient states for the last month she has been taking it as prescribed but at different times of the day.      Patient Care Team  Primary Care Provider: Vanessa Montalvo APRN    Past Medical History:     Allergies   Allergen Reactions    Latex Anaphylaxis     Past Medical History:   Diagnosis Date    ADD (attention deficit disorder)     Anxiety     Depression      Past Surgical History:   Procedure Laterality Date    DILATATION AND CURETTAGE  2009    KNEE ACL RECONSTRUCTION  2011    THYROID SURGERY       Family History   Problem Relation Age of Onset    Stroke Father     Lung cancer Father     Colon cancer Maternal Grandmother     Diabetes Maternal Grandmother     Lung cancer Paternal Grandfather     Breast cancer Other        Home Medications:  Prior to Admission medications    Medication Sig Start Date End  "Date Taking? Authorizing Provider   busPIRone (BUSPAR) 7.5 MG tablet TID prn 11/8/23   Vanessa Montalvo APRN   clindamycin (CLEOCIN T) 1 % lotion  9/25/23   Mayi Henderson MD   Dupilumab (Dupixent) 300 MG/2ML solution prefilled syringe 2 mL.    Mayi Henderson MD   lisinopril-hydrochlorothiazide (Zestoretic) 10-12.5 MG per tablet Take 1 tablet by mouth Daily. 8/8/24   Vanessa Montalvo APRN   tacrolimus (PROTOPIC) 0.1 % ointment  9/22/23   Mayi Henderson MD        Social History:   Social History     Tobacco Use    Smoking status: Former     Types: Cigarettes    Smokeless tobacco: Never   Vaping Use    Vaping status: Never Used   Substance Use Topics    Drug use: Never         Review of Systems:  Review of Systems   Constitutional:  Negative for fever.   Eyes:  Negative for visual disturbance.   Respiratory:  Negative for shortness of breath.    Cardiovascular:  Negative for chest pain.   Gastrointestinal:  Positive for nausea. Negative for abdominal pain.   Musculoskeletal:  Negative for neck pain.   Neurological:  Positive for dizziness, weakness (Generalized) and light-headedness. Negative for syncope.        Physical Exam:  /96 (BP Location: Right arm, Patient Position: Lying)   Pulse 95   Temp 98.4 °F (36.9 °C) (Oral)   Resp 14   Ht 165.1 cm (65\")   Wt 69.2 kg (152 lb 8.9 oz)   SpO2 96%   BMI 25.39 kg/m²     Physical Exam  Vitals and nursing note reviewed.   Constitutional:       General: She is not in acute distress.     Appearance: Normal appearance. She is normal weight. She is not ill-appearing, toxic-appearing or diaphoretic.   HENT:      Head: Normocephalic and atraumatic.      Nose: Nose normal.   Eyes:      Extraocular Movements:      Right eye: Nystagmus present.      Conjunctiva/sclera: Conjunctivae normal.      Right eye: Right conjunctiva is not injected. No chemosis, exudate or hemorrhage.     Left eye: Left conjunctiva is not injected. No chemosis, exudate or " hemorrhage.     Pupils: Pupils are equal, round, and reactive to light.   Cardiovascular:      Rate and Rhythm: Normal rate and regular rhythm.      Heart sounds: Normal heart sounds.   Pulmonary:      Effort: Pulmonary effort is normal.      Breath sounds: Normal breath sounds.   Abdominal:      General: Abdomen is flat. There is no distension.   Musculoskeletal:         General: Normal range of motion.      Cervical back: Normal range of motion and neck supple.   Skin:     General: Skin is warm and dry.   Neurological:      General: No focal deficit present.      Mental Status: She is alert and oriented to person, place, and time.   Psychiatric:         Mood and Affect: Mood normal.         Behavior: Behavior normal.         Thought Content: Thought content normal.         Judgment: Judgment normal.                Procedures:  Procedures      Medical Decision Making:    Comorbidities that affect care:    ADD, anxiety, depression    External Notes reviewed:    Previous Clinic Note: Patient last in by [facility] on 8-8-2024      The following orders were placed and all results were independently analyzed by me:  Orders Placed This Encounter   Procedures    XR Chest 1 View    CT Head Without Contrast    [place] Draw    Comprehensive Metabolic Panel    Single High Sensitivity Troponin T    Magnesium    Urinalysis With Microscopic If Indicated (No Culture) - Urine, Clean Catch    CBC Auto Differential    hCG, Quantitative, Pregnancy    NPO Diet NPO Type: Strict NPO    Undress & Gown    Continuous Pulse Oximetry    Vital Signs    Orthostatic Blood Pressure    Oxygen Therapy- Nasal Cannula; Titrate 1-6 LPM Per SpO2; 90 - 95%    POC Glucose Once    ECG 12 Lead ED Triage Standing Order; Weak / Dizzy / AMS    Insert Peripheral IV    Fall Precautions    CBC & Differential    Green Top (Gel)    Lavender Top    Gold Top - SST    Light Blue Top       Medications Given in the Emergency Department:  Medications   sodium  chloride 0.9 % flush 10 mL (has no administration in time range)   meclizine (ANTIVERT) tablet 25 mg (25 mg Oral Given 9/27/24 1400)        ED Course:         Labs:    Lab Results (last 24 hours)       Procedure Component Value Units Date/Time    CBC & Differential [[id number]]  (Abnormal) Collected: 09/27/24 1041    Specimen: Blood from Arm, Right Updated: 09/27/24 1058    Narrative:      The following orders were created for panel order CBC & Differential.  Procedure                               Abnormality         Status                     ---------                               -----------         ------                     CBC Auto Differential[[id number]]        Abnormal            Final result                 Please view results for these tests on the individual orders.    Comprehensive Metabolic Panel [[id number]]  (Abnormal) Collected: 09/27/24 1041    Specimen: Blood from Arm, Right Updated: 09/27/24 1114     Glucose 120 mg/dL      BUN 10 mg/dL      Creatinine 0.78 mg/dL      Sodium 134 mmol/L      Potassium 3.9 mmol/L      Chloride 97 mmol/L      CO2 25.7 mmol/L      Calcium 9.9 mg/dL      Total Protein 7.4 g/dL      Albumin 4.3 g/dL      ALT (SGPT) 15 U/L      AST (SGOT) 22 U/L      Alkaline Phosphatase 83 U/L      Total Bilirubin 0.6 mg/dL      Globulin 3.1 gm/dL      A/G Ratio 1.4 g/dL      BUN/Creatinine Ratio 12.8     Anion Gap 11.3 mmol/L      eGFR 95.6 mL/min/1.73     Narrative:      GFR Normal >60  Chronic Kidney Disease <60  Kidney Failure <15      Single High Sensitivity Troponin T [[id number]]  (Normal) Collected: 09/27/24 1041    Specimen: Blood from Arm, Right Updated: 09/27/24 1113     HS Troponin T <6 ng/L     Narrative:      High Sensitive Troponin T Reference Range:  <14.0 ng/L- Negative Female for AMI  <22.0 ng/L- Negative Male for AMI  >=14 - Abnormal Female indicating possible myocardial injury.  >=22 - Abnormal Male indicating possible myocardial injury.   Clinicians would have to  utilize clinical acumen, EKG, Troponin, and serial changes to determine if it is an Acute Myocardial Infarction or myocardial injury due to an underlying chronic condition.         Magnesium [[id number]]  (Normal) Collected: 09/27/24 1041    Specimen: Blood from Arm, Right Updated: 09/27/24 1113     Magnesium 2.0 mg/dL     CBC Auto Differential [[id number]]  (Abnormal) Collected: 09/27/24 1041    Specimen: Blood from Arm, Right Updated: 09/27/24 1058     WBC 9.36 10*3/mm3      RBC 4.22 10*6/mm3      Hemoglobin 13.9 g/dL      Hematocrit 40.3 %      MCV 95.5 fL      MCH 32.9 pg      MCHC 34.5 g/dL      RDW 12.1 %      RDW-SD 42.4 fl      MPV 10.7 fL      Platelets 285 10*3/mm3      Neutrophil % 67.7 %      Lymphocyte % 24.6 %      Monocyte % 6.2 %      Eosinophil % 0.7 %      Basophil % 0.4 %      Immature Grans % 0.4 %      Neutrophils, Absolute 6.33 10*3/mm3      Lymphocytes, Absolute 2.30 10*3/mm3      Monocytes, Absolute 0.58 10*3/mm3      Eosinophils, Absolute 0.07 10*3/mm3      Basophils, Absolute 0.04 10*3/mm3      Immature Grans, Absolute 0.04 10*3/mm3      nRBC 0.0 /100 WBC     hCG, Quantitative, Pregnancy [[id number]] Collected: 09/27/24 1041    Specimen: Blood from Arm, Right Updated: 09/27/24 1317     HCG Quantitative <0.50 mIU/mL     Narrative:      HCG Ranges by Gestational Age    Females - non-pregnant premenopausal   </= 1mIU/mL HCG  Females - postmenopausal               </= 7mIU/mL HCG    3 Weeks       5.4   -      72 mIU/mL  4 Weeks      10.2   -     708 mIU/mL  5 Weeks       217   -   8,245 mIU/mL  6 Weeks       152   -  32,177 mIU/mL  7 Weeks     4,059   - 153,767 mIU/mL  8 Weeks    31,366   - 149,094 mIU/mL  9 Weeks    59,109   - 135,901 mIU/mL  10 Weeks   44,186   - 170,409 mIU/mL  12 Weeks   27,107   - 201,615 mIU/mL  14 Weeks   24,302   -  93,646 mIU/mL  15 Weeks   12,540   -  69,747 mIU/mL  16 Weeks    8,904   -  55,332 mIU/mL  17 Weeks    8,240   -  51,793 mIU/mL  18 Weeks    9,649   -  55,271  mIU/mL      Urinalysis With Microscopic If Indicated (No Culture) - Urine, Clean Catch [[id number]]  (Normal) Collected: 09/27/24 1107    Specimen: Urine, Clean Catch Updated: 09/27/24 1144     Color, UA Yellow     Appearance, UA Clear     pH, UA 8.0     Specific Gravity, UA 1.014     Glucose, UA Negative     Ketones, UA Negative     Bilirubin, UA Negative     Blood, UA Negative     Protein, UA Negative     Leuk Esterase, UA Negative     Nitrite, UA Negative     Urobilinogen, UA 0.2 E.U./dL    Narrative:      Urine microscopic not indicated.             Imaging:    CT Head Without Contrast    Result Date: 9/27/2024  CT HEAD WO CONTRAST Date of Exam: 9/27/2024 2:08 PM EDT Indication: Dizziness. Comparison: None available. Technique: Axial CT images were obtained of the head without contrast administration.  Reconstructed coronal and sagittal images were also obtained. Automated exposure control and iterative construction methods were used. Findings: No focal brain lesion, mass or intracranial hemorrhage is identified. The ventricles are normal in size and configuration. Extracranial soft tissues appear normal. No focal calvarial abnormality is seen.     Impression: Negative study Electronically Signed: Oswald Caban MD  9/27/2024 2:26 PM EDT  Workstation ID: [id number]    XR Chest 1 View    Result Date: 9/27/2024  XR CHEST 1 VW Date of Exam: 9/27/2024 12:00 PM EDT Indication: Weak/Dizzy/AMS triage protocol Comparison: None available. Findings: Heart and pulmonary vessels and mediastinal contours appear within normal limits. Lung fields are clear of acute infiltrates or effusions. There is no pneumothorax.     Impression: Negative. Electronically Signed: Kimberly Aguila MD  9/27/2024 12:07 PM EDT  Workstation ID: [id number]       Differential Diagnosis and Discussion:    Dizziness: Based on the patient's history, signs, and symptoms, the diffential diagnosis includes but is not limited to meningitis, stroke, sepsis,  subarachnoid hemorrhage, intracranial bleeding, encephalitis, vertigo, electrolyte imbalance, and metabolic disorders.    All labs were reviewed and interpreted by me.  All X-rays impressions were independently interpreted by me.  EKG was interpreted by me.  CT scan radiology impression was interpreted by me.    MDM  Number of Diagnoses or Management Options  Hypertension, unspecified type  Vertigo  Diagnosis management comments: Patient presented to the emergency department today for headache and dizziness.  CBC notes normal blood cell count and hemoglobin hematocrit.  CMP notes mild glucose of 120 a sodium of 134 with normal liver functions.  Magnesium unremarkable.  hCG negative.  Urinalysis unremarkable.  EKG did not show any signs of acute ischemia.  Chest x-ray had no acute findings.  CT head had no acute findings.  Patient did have mild nystagmus on the emergency department I did medicate patient with meclizine for vertigo.  She states this improved her symptoms.  Educated patient on hypertension and asked her to monitor her blood pressure twice daily and follow-up with PCP in 1 week for reevaluation to determine if any medication adjustments need to occur.  Patient was given strict return to the emergency department guidelines.       Amount and/or Complexity of Data Reviewed  Clinical lab tests: reviewed and ordered  Tests in the radiology section of CPT®: reviewed and ordered  Tests in the medicine section of CPT®: reviewed    Risk of Complications, Morbidity, and/or Mortality  Presenting problems: moderate  Diagnostic procedures: moderate  Management options: low    Patient Progress  Patient progress: stable       Patient Care Considerations:    MRI: I considered ordering an MRI however there is no neurologic deficit      Consultants/Shared Management Plan:    None    Social Determinants of Health:    Patient is independent, reliable, and has access to care.       Disposition and Care  Coordination:    Discharged: The patient is suitable and stable for discharge with no need for consideration of admission.    I have explained the patient´s condition, diagnoses and treatment plan based on the information available to me at this time. I have answered questions and addressed any concerns. The patient has a good  understanding of the patient´s diagnosis, condition, and treatment plan as can be expected at this point. The vital signs have been stable. The patient´s condition is stable and appropriate for discharge from the emergency department.      The patient will pursue further outpatient evaluation with the primary care physician or other designated or consulting physician as outlined in the discharge instructions. They are agreeable to this plan of care and follow-up instructions have been explained in detail. The patient has received these instructions in written format and has expressed an understanding of the discharge instructions. The patient is aware that any significant change in condition or worsening of symptoms should prompt an immediate return to this or the closest emergency department or call to 911.  I have explained discharge medications and the need for follow up with the patient/caretakers. This was also printed in the discharge instructions. Patient was discharged with the following medications and follow up:      Medication List        New Prescriptions      meclizine 25 MG tablet  Commonly known as: ANTIVERT  Take 2 tablets by mouth 3 (Three) Times a Day As Needed for Dizziness.               Where to Get Your Medications        These medications were sent to [facility] [id number] - [place] - [address] AT [facility] [address] - [phone]  - [phone] FX  [address] [address]      Phone: [phone]   meclizine 25 MG tablet      Vanessa Montalvo, [place]IVER[place]  [address]  [address]  [place] [id number] [phone]              Final diagnoses:   Vertigo   Hypertension, unspecified type        ED Disposition       ED Disposition   Discharge    Condition   Stable    Comment   --               This medical record created using voice recognition software.             Alfie Caldwell PA-C  09/27/24 4495

## 2024-09-27 NOTE — DISCHARGE INSTRUCTIONS
Monitor your blood pressure twice daily.  Try to take your blood pressure medication at the same time every morning.  He must immediately return to the emergency department if your blood pressure is ever higher than 180/120 this is an emergency.  Take meclizine as needed for dizziness.  Please follow-up with your primary care provider in 1 week after documenting your blood pressures that he checked twice a day over the last week.

## 2024-10-01 ENCOUNTER — PREP FOR SURGERY (OUTPATIENT)
Dept: OTHER | Facility: HOSPITAL | Age: 44
End: 2024-10-01
Payer: COMMERCIAL

## 2024-10-01 ENCOUNTER — OFFICE VISIT (OUTPATIENT)
Dept: FAMILY MEDICINE CLINIC | Facility: CLINIC | Age: 44
End: 2024-10-01
Payer: COMMERCIAL

## 2024-10-01 ENCOUNTER — OFFICE VISIT (OUTPATIENT)
Dept: SURGERY | Facility: CLINIC | Age: 44
End: 2024-10-01
Payer: COMMERCIAL

## 2024-10-01 VITALS
OXYGEN SATURATION: 100 % | HEIGHT: 65 IN | WEIGHT: 153 LBS | SYSTOLIC BLOOD PRESSURE: 152 MMHG | BODY MASS INDEX: 25.49 KG/M2 | HEART RATE: 100 BPM | DIASTOLIC BLOOD PRESSURE: 88 MMHG

## 2024-10-01 VITALS
SYSTOLIC BLOOD PRESSURE: 134 MMHG | WEIGHT: 154 LBS | BODY MASS INDEX: 25.66 KG/M2 | HEART RATE: 93 BPM | HEIGHT: 65 IN | DIASTOLIC BLOOD PRESSURE: 123 MMHG

## 2024-10-01 DIAGNOSIS — F41.9 ANXIETY: ICD-10-CM

## 2024-10-01 DIAGNOSIS — I10 PRIMARY HYPERTENSION: Primary | ICD-10-CM

## 2024-10-01 DIAGNOSIS — Z12.11 SCREENING FOR MALIGNANT NEOPLASM OF COLON: Primary | ICD-10-CM

## 2024-10-01 DIAGNOSIS — R42 VERTIGO: ICD-10-CM

## 2024-10-01 PROCEDURE — 99214 OFFICE O/P EST MOD 30 MIN: CPT | Performed by: NURSE PRACTITIONER

## 2024-10-01 PROCEDURE — S0285 CNSLT BEFORE SCREEN COLONOSC: HCPCS | Performed by: NURSE PRACTITIONER

## 2024-10-01 RX ORDER — SODIUM CHLORIDE 0.9 % (FLUSH) 0.9 %
10 SYRINGE (ML) INJECTION AS NEEDED
OUTPATIENT
Start: 2024-10-01

## 2024-10-01 RX ORDER — LISINOPRIL/HYDROCHLOROTHIAZIDE 10-12.5 MG
1 TABLET ORAL 2 TIMES DAILY
Qty: 180 TABLET | Refills: 1 | Status: SHIPPED | OUTPATIENT
Start: 2024-10-01

## 2024-10-01 RX ORDER — ESCITALOPRAM OXALATE 20 MG/1
TABLET ORAL
Qty: 90 TABLET | Refills: 1 | Status: SHIPPED | OUTPATIENT
Start: 2024-10-01

## 2024-10-01 RX ORDER — SODIUM, POTASSIUM,MAG SULFATES 17.5-3.13G
SOLUTION, RECONSTITUTED, ORAL ORAL
Qty: 354 ML | Refills: 0 | Status: SHIPPED | OUTPATIENT
Start: 2024-10-01

## 2024-10-01 RX ORDER — SODIUM CHLORIDE 0.9 % (FLUSH) 0.9 %
3 SYRINGE (ML) INJECTION EVERY 12 HOURS SCHEDULED
OUTPATIENT
Start: 2024-10-01

## 2024-10-01 RX ORDER — SODIUM CHLORIDE 9 MG/ML
40 INJECTION, SOLUTION INTRAVENOUS AS NEEDED
OUTPATIENT
Start: 2024-10-01

## 2024-10-01 NOTE — PROGRESS NOTES
Chief Complaint  Dizziness (\), Hypertension, and Anxiety    Subjective            Judith Margot Begum presents to [facility]  History of Present Illness  Pt is a f/u for HTN and vertigo. Pt was seen in ER on 9/27 for dizziness. Pt was prescribed meclizine. Pt states she does have some dizziness at times. Pt states she takes the medication if possible. Pt states the medication makes her sleepy. Pt states she does have popping in the (L) ear at times.     Pt's BP has been elevated. BP today at another office 134/123. Pt thinks this may be due to anxiety. Pt unable to take buspar due to nausea after taking medication. Pt has stopped this medication. Pt reports she is taking her BP med as prescribed. She is open to starting a daily med for anxiety.  She has only been on wellbutrin in the past and did not like the way it made her feel.    Pt is due pap. Pt is followed by Dr. Rae, will schedule.     Pt is due flu vaccine. Pt declines and understands the risk of not having.         Past Medical History:   Diagnosis Date    ADD (attention deficit disorder)     Anxiety     Depression        Allergies   Allergen Reactions    Latex Anaphylaxis        Past Surgical History:   Procedure Laterality Date    DILATATION AND CURETTAGE  2009    KNEE ACL RECONSTRUCTION  2011    THYROID SURGERY          Social History     Tobacco Use    Smoking status: Former     Types: Cigarettes    Smokeless tobacco: Never   Substance Use Topics    Alcohol use: Never       Family History   Problem Relation Age of Onset    Stroke Father     Lung cancer Father     Colon cancer Maternal Grandmother     Diabetes Maternal Grandmother     Lung cancer Paternal Grandfather     Breast cancer Other         Current Outpatient Medications on File Prior to Visit   Medication Sig    clindamycin (CLEOCIN T) 1 % lotion     Dupilumab (Dupixent) 300 MG/2ML solution prefilled syringe 2 mL.    meclizine (ANTIVERT) 25 MG tablet  "Take 2 tablets by mouth 3 (Three) Times a Day As Needed for Dizziness.    sodium-potassium-magnesium sulfates (Suprep Bowel Prep Kit) 17.5-3.13-1.6 GM/177ML solution oral solution Take as directed.  Instructions given in office.  Dispense: 2 bottles    tacrolimus (PROTOPIC) 0.1 % ointment     [DISCONTINUED] lisinopril-hydrochlorothiazide (Zestoretic) 10-12.5 MG per tablet Take 1 tablet by mouth Daily.    [DISCONTINUED] busPIRone (BUSPAR) 7.5 MG tablet TID prn (Patient not taking: Reported on 10/1/2024)     No current facility-administered medications on file prior to visit.       Health Maintenance Due   Topic Date Due    COLORECTAL CANCER SCREENING  Never done    PAP SMEAR  Never done       Objective     /88   Pulse 100   Ht 165.1 cm (65\")   Wt 69.4 kg (153 lb)   SpO2 100%   BMI 25.46 kg/m²       Physical Exam  Constitutional:       General: She is not in acute distress.     Appearance: Normal appearance. She is not ill-appearing.   HENT:      Head: Normocephalic and atraumatic.      Right Ear: Tympanic membrane, ear canal and external ear normal.      Left Ear: Tympanic membrane, ear canal and external ear normal.      Nose: Nose normal.   Cardiovascular:      Rate and Rhythm: Normal rate and regular rhythm.      Heart sounds: Normal heart sounds. No murmur heard.  Pulmonary:      Effort: Pulmonary effort is normal. No respiratory distress.      Breath sounds: Normal breath sounds.   Chest:      Chest wall: No tenderness.   Musculoskeletal:         General: No swelling or tenderness. Normal range of motion.      Cervical back: Normal range of motion and neck supple.   Skin:     General: Skin is warm and dry.      Findings: No rash.   Neurological:      General: No focal deficit present.      Mental Status: She is alert and oriented to person, place, and time. Mental status is at baseline.      Gait: Gait normal.   Psychiatric:         Attention and Perception: Attention normal.         Mood and Affect: " Mood and affect normal.         Speech: Speech normal.         Behavior: Behavior normal. Behavior is cooperative.         Thought Content: Thought content normal. Thought content does not include suicidal ideation.         Judgment: Judgment normal.           Result Review :                           Assessment and Plan        Diagnoses and all orders for this visit:    1. Primary hypertension (Primary)  Comments:  will increase zestoretic to 10/12.5mg BID for better cotrol of BP, pt to keep a BP log and bring to f/u appt  Orders:  -     lisinopril-hydrochlorothiazide (Zestoretic) 10-12.5 MG per tablet; Take 1 tablet by mouth 2 (Two) Times a Day.  Dispense: 180 tablet; Refill: 1    2. Vertigo  Comments:  continue meclizine 25mg prn    3. Anxiety  -     escitalopram (Lexapro) 20 MG tablet; Take 1/2 tab 10mg q day for 2 weeks then 1 tab 20mg q day  Dispense: 90 tablet; Refill: 1              Follow Up     Return in about 1 month (around 11/1/2024).    Patient was given instructions and counseling regarding her condition or for health maintenance advice. Please see specific information pulled into the AVS if appropriate.     Judith Begum  reports that she has quit smoking. Her smoking use included cigarettes. She has never used smokeless tobacco.

## 2024-10-01 NOTE — PROGRESS NOTES
Chief Complaint: Colonoscopy    Subjective      Colonoscopy consultation       History of Present Illness  Judith Begum is a 44 y.o. female presents to [facility] for colonoscopy consultation.    Patient presents today on referral from Vanessa Gardner for colonoscopy consultation.  Patient denies any abdominal pain, change in bowel habit, or rectal bleeding.  Admits to family history of colon cancer with her maternal and paternal grand mother.  No previous colonoscopy.    Patient denies SANTANA.  Denies any cardiac issues.  Denies taking any GLP-1 receptors.    Objective     Past Medical History:   Diagnosis Date    ADD (attention deficit disorder)     Anxiety     Depression        Past Surgical History:   Procedure Laterality Date    DILATATION AND CURETTAGE  2009    KNEE ACL RECONSTRUCTION  2011    THYROID SURGERY         Outpatient Medications Marked as Taking for the 10/1/24 encounter (Office Visit) with Stacey Yoon APRN   Medication Sig Dispense Refill    clindamycin (CLEOCIN T) 1 % lotion       Dupilumab (Dupixent) 300 MG/2ML solution prefilled syringe 2 mL.      lisinopril-hydrochlorothiazide (Zestoretic) 10-12.5 MG per tablet Take 1 tablet by mouth Daily. 90 tablet 1    meclizine (ANTIVERT) 25 MG tablet Take 2 tablets by mouth 3 (Three) Times a Day As Needed for Dizziness. 20 tablet 0    tacrolimus (PROTOPIC) 0.1 % ointment          Allergies   Allergen Reactions    Latex Anaphylaxis        Family History   Problem Relation Age of Onset    Stroke Father     Lung cancer Father     Colon cancer Maternal Grandmother     Diabetes Maternal Grandmother     Lung cancer Paternal Grandfather     Breast cancer Other        Social History     Socioeconomic History    Marital status:    Tobacco Use    Smoking status: Former     Types: Cigarettes    Smokeless tobacco: Never   Vaping Use    Vaping status: Never Used   Substance and Sexual Activity    Alcohol use: Never  "   Drug use: Never       Review of Systems   Constitutional:  Negative for chills and fever.   Gastrointestinal:  Negative for abdominal distention, abdominal pain, anal bleeding, blood in stool, constipation, diarrhea and rectal pain.        Vital Signs:   BP (!) 134/123 (BP Location: Right arm, Patient Position: Sitting, Cuff Size: Adult)   Pulse 93   Ht 165.1 cm (65\")   Wt 69.9 kg (154 lb)   BMI 25.63 kg/m²      Physical Exam  Vitals and nursing note reviewed.   Constitutional:       General: She is not in acute distress.     Appearance: Normal appearance. She is not ill-appearing.   HENT:      Head: Normocephalic and atraumatic.   Cardiovascular:      Rate and Rhythm: Normal rate.   Pulmonary:      Effort: Pulmonary effort is normal.      Breath sounds: No stridor.   Abdominal:      Palpations: Abdomen is soft.      Tenderness: There is no guarding.   Musculoskeletal:         General: No deformity. Normal range of motion.   Skin:     General: Skin is warm and dry.      Coloration: Skin is not jaundiced.   Neurological:      General: No focal deficit present.      Mental Status: She is alert and oriented to person, place, and time.   Psychiatric:         Mood and Affect: Mood normal.         Thought Content: Thought content normal.          Result Review :          []  Laboratory  []  Radiology  []  Pathology  []  Microbiology  []  EKG/Telemetry   []  Cardiology/Vascular   []  Old records  I spent 15 minutes caring for Judith on this date of service. This time includes time spent by me in the following activities: reviewing tests, obtaining and/or reviewing a separately obtained history, performing a medically appropriate examination and/or evaluation, ordering medications, tests, or procedures, and documenting information in the medical record.     Assessment and Plan    Diagnoses and all orders for this visit:    1. Screening for malignant neoplasm of colon (Primary)    Other orders  -     " sodium-potassium-magnesium sulfates (Suprep Bowel Prep Kit) 17.5-3.13-1.6 GM/177ML solution oral solution; Take as directed.  Instructions given in office.  Dispense: 2 bottles  Dispense: 354 mL; Refill: 0        Follow Up   Return for Schedule colonoscopy with Dr. Yo on 11/5/2024 [facility], [place].    Hospital arrival time: 12:30    Possible risks/complications, benefits, and alternatives to surgical or invasive procedures have been explained to patient and/or legal guardian.    Patient has been evaluated and can tolerate anesthesia and/or sedation. Risks, benefits, and alternatives to anesthesia and sedation have been explained to the patient and/or legal guardian. Patient verbalizes understanding and is willing to proceed with the above plan.     Patient was given instructions and counseling regarding her condition or for health maintenance advice. Please see specific information pulled into the AVS if appropriate.      Thank you for allowing me to participate in the care of this patient. Please call with questions or concerns.

## 2024-10-25 NOTE — PRE-PROCEDURE INSTRUCTIONS
Attempted to reach pt, unable to get through and no voicemail:  Reminded of arrival time at   1230      , Entrance C of the main hospital. Instructed to bring or have a  over the age of 18 set up to drive you home the day of procedure.    Instructed on clear liquid diet the day before, nothing red or purple. Call with any questions about the prep or if in need of the prep.  Reminded them not to eat or drink anything am of procedure unless its a sip of water with medications. Hold lisinopril-hctz am of procedure.

## 2024-10-30 ENCOUNTER — LAB (OUTPATIENT)
Dept: LAB | Facility: HOSPITAL | Age: 44
End: 2024-10-30
Payer: COMMERCIAL

## 2024-10-30 DIAGNOSIS — I10 PRIMARY HYPERTENSION: ICD-10-CM

## 2024-10-30 DIAGNOSIS — Z13.29 SCREENING FOR THYROID DISORDER: ICD-10-CM

## 2024-10-30 DIAGNOSIS — Z13.220 SCREENING FOR CHOLESTEROL LEVEL: ICD-10-CM

## 2024-10-30 LAB
ALBUMIN SERPL-MCNC: 3.9 G/DL (ref 3.5–5.2)
ALBUMIN/GLOB SERPL: 1.3 G/DL
ALP SERPL-CCNC: 67 U/L (ref 39–117)
ALT SERPL W P-5'-P-CCNC: 16 U/L (ref 1–33)
ANION GAP SERPL CALCULATED.3IONS-SCNC: 8.2 MMOL/L (ref 5–15)
AST SERPL-CCNC: 20 U/L (ref 1–32)
BASOPHILS # BLD AUTO: 0.05 10*3/MM3 (ref 0–0.2)
BASOPHILS NFR BLD AUTO: 0.6 % (ref 0–1.5)
BILIRUB SERPL-MCNC: 0.5 MG/DL (ref 0–1.2)
BUN SERPL-MCNC: 13 MG/DL (ref 6–20)
BUN/CREAT SERPL: 15.5 (ref 7–25)
CALCIUM SPEC-SCNC: 9.3 MG/DL (ref 8.6–10.5)
CHLORIDE SERPL-SCNC: 105 MMOL/L (ref 98–107)
CHOLEST SERPL-MCNC: 173 MG/DL (ref 0–200)
CO2 SERPL-SCNC: 24.8 MMOL/L (ref 22–29)
CREAT SERPL-MCNC: 0.84 MG/DL (ref 0.57–1)
DEPRECATED RDW RBC AUTO: 44 FL (ref 37–54)
EGFRCR SERPLBLD CKD-EPI 2021: 87.5 ML/MIN/1.73
EOSINOPHIL # BLD AUTO: 0.12 10*3/MM3 (ref 0–0.4)
EOSINOPHIL NFR BLD AUTO: 1.4 % (ref 0.3–6.2)
ERYTHROCYTE [DISTWIDTH] IN BLOOD BY AUTOMATED COUNT: 12 % (ref 12.3–15.4)
GLOBULIN UR ELPH-MCNC: 2.9 GM/DL
GLUCOSE SERPL-MCNC: 95 MG/DL (ref 65–99)
HCT VFR BLD AUTO: 38.7 % (ref 34–46.6)
HDLC SERPL-MCNC: 43 MG/DL (ref 40–60)
HGB BLD-MCNC: 12.6 G/DL (ref 12–15.9)
IMM GRANULOCYTES # BLD AUTO: 0.03 10*3/MM3 (ref 0–0.05)
IMM GRANULOCYTES NFR BLD AUTO: 0.4 % (ref 0–0.5)
LDLC SERPL CALC-MCNC: 117 MG/DL (ref 0–100)
LDLC/HDLC SERPL: 2.71 {RATIO}
LYMPHOCYTES # BLD AUTO: 2.68 10*3/MM3 (ref 0.7–3.1)
LYMPHOCYTES NFR BLD AUTO: 32.3 % (ref 19.6–45.3)
MCH RBC QN AUTO: 32.9 PG (ref 26.6–33)
MCHC RBC AUTO-ENTMCNC: 32.6 G/DL (ref 31.5–35.7)
MCV RBC AUTO: 101 FL (ref 79–97)
MONOCYTES # BLD AUTO: 0.56 10*3/MM3 (ref 0.1–0.9)
MONOCYTES NFR BLD AUTO: 6.7 % (ref 5–12)
NEUTROPHILS NFR BLD AUTO: 4.86 10*3/MM3 (ref 1.7–7)
NEUTROPHILS NFR BLD AUTO: 58.6 % (ref 42.7–76)
NRBC BLD AUTO-RTO: 0 /100 WBC (ref 0–0.2)
PLATELET # BLD AUTO: 290 10*3/MM3 (ref 140–450)
PMV BLD AUTO: 10.9 FL (ref 6–12)
POTASSIUM SERPL-SCNC: 4.4 MMOL/L (ref 3.5–5.2)
PROT SERPL-MCNC: 6.8 G/DL (ref 6–8.5)
RBC # BLD AUTO: 3.83 10*6/MM3 (ref 3.77–5.28)
SODIUM SERPL-SCNC: 138 MMOL/L (ref 136–145)
TRIGL SERPL-MCNC: 67 MG/DL (ref 0–150)
TSH SERPL DL<=0.05 MIU/L-ACNC: 1.86 UIU/ML (ref 0.27–4.2)
VLDLC SERPL-MCNC: 13 MG/DL (ref 5–40)
WBC NRBC COR # BLD AUTO: 8.3 10*3/MM3 (ref 3.4–10.8)

## 2024-10-30 PROCEDURE — 80050 GENERAL HEALTH PANEL: CPT

## 2024-10-30 PROCEDURE — 80061 LIPID PANEL: CPT

## 2024-10-30 PROCEDURE — 36415 COLL VENOUS BLD VENIPUNCTURE: CPT

## 2024-10-30 NOTE — PRE-PROCEDURE INSTRUCTIONS
"Instructed on date and arrival time of 1230. Instructed that arrival time is not their procedure time but allows time to prepare for procedure.  Come to entrance \"C\". Must have  over age 18 to drive home.  May have two visitors; however, children under 12 must stay in waiting room.  Discussed clear liquid diet (no red or purple), bowel prep, and NPO.  May take medications as usual except for blood thinners, diabetic medications, and weight loss medications.  Verbalized understanding of instructions given.  Instructed to call for questions or concerns.  "

## 2024-10-31 ENCOUNTER — OFFICE VISIT (OUTPATIENT)
Dept: FAMILY MEDICINE CLINIC | Facility: CLINIC | Age: 44
End: 2024-10-31
Payer: COMMERCIAL

## 2024-10-31 VITALS
SYSTOLIC BLOOD PRESSURE: 121 MMHG | WEIGHT: 154.4 LBS | DIASTOLIC BLOOD PRESSURE: 89 MMHG | BODY MASS INDEX: 25.72 KG/M2 | OXYGEN SATURATION: 100 % | HEIGHT: 65 IN | HEART RATE: 92 BPM

## 2024-10-31 DIAGNOSIS — I10 PRIMARY HYPERTENSION: ICD-10-CM

## 2024-10-31 DIAGNOSIS — F41.9 ANXIETY: ICD-10-CM

## 2024-10-31 DIAGNOSIS — F32.A DEPRESSION, UNSPECIFIED DEPRESSION TYPE: Primary | ICD-10-CM

## 2024-10-31 DIAGNOSIS — Z12.31 ENCOUNTER FOR SCREENING MAMMOGRAM FOR MALIGNANT NEOPLASM OF BREAST: ICD-10-CM

## 2024-10-31 DIAGNOSIS — R42 VERTIGO: ICD-10-CM

## 2024-10-31 PROCEDURE — 99214 OFFICE O/P EST MOD 30 MIN: CPT | Performed by: NURSE PRACTITIONER

## 2024-10-31 NOTE — PROGRESS NOTES
Chief Complaint  Anxiety, depression, HTN, vertigo    Subjective            Judith Margot Begum presents to [facility]  History of Present Illness  Pt is a 1 mo f/u for anxiety, depression, HTN, vertigo . Pt was started on lexapro 20 mg. PT states she is doing much better on it, even her vertigo has improved. She f    Pt is followed by Dr. Rae with GYN, pt has PAP scheduled for November 2024.    Pt is scheduled for colon screening on  11/5/24    Mammogram due in February 2025, orders placed.        Past Medical History:   Diagnosis Date    ADD (attention deficit disorder)     Anxiety     Depression        Allergies   Allergen Reactions    Latex Anaphylaxis        Past Surgical History:   Procedure Laterality Date    DILATATION AND CURETTAGE  2009    KNEE ACL RECONSTRUCTION  2011    THYROID SURGERY          Social History     Tobacco Use    Smoking status: Former     Types: Cigarettes    Smokeless tobacco: Never   Substance Use Topics    Alcohol use: Never       Family History   Problem Relation Age of Onset    Stroke Father     Lung cancer Father     Colon cancer Maternal Grandmother     Diabetes Maternal Grandmother     Lung cancer Paternal Grandfather     Breast cancer Other         Current Outpatient Medications on File Prior to Visit   Medication Sig    clindamycin (CLEOCIN T) 1 % lotion     Dupilumab (Dupixent) 300 MG/2ML solution prefilled syringe 2 mL.    escitalopram (Lexapro) 20 MG tablet Take 1/2 tab 10mg q day for 2 weeks then 1 tab 20mg q day    lisinopril-hydrochlorothiazide (Zestoretic) 10-12.5 MG per tablet Take 1 tablet by mouth 2 (Two) Times a Day.    meclizine (ANTIVERT) 25 MG tablet Take 2 tablets by mouth 3 (Three) Times a Day As Needed for Dizziness.    sodium-potassium-magnesium sulfates (Suprep Bowel Prep Kit) 17.5-3.13-1.6 GM/177ML solution oral solution Take as directed.  Instructions given in office.  Dispense: 2 bottles    tacrolimus (PROTOPIC) 0.1  "% ointment      No current facility-administered medications on file prior to visit.       Health Maintenance Due   Topic Date Due    COLORECTAL CANCER SCREENING  Never done    PAP SMEAR  Never done       Objective     /89 (BP Location: Left arm, Patient Position: Sitting, Cuff Size: Large Adult)   Pulse 92   Ht 165.1 cm (65\")   Wt 70 kg (154 lb 6.4 oz)   SpO2 100%   BMI 25.69 kg/m²       Physical Exam  Constitutional:       General: She is not in acute distress.     Appearance: Normal appearance. She is not ill-appearing.   HENT:      Head: Normocephalic and atraumatic.      Right Ear: Tympanic membrane, ear canal and external ear normal.      Left Ear: Tympanic membrane, ear canal and external ear normal.      Nose: Nose normal.   Cardiovascular:      Rate and Rhythm: Normal rate and regular rhythm.      Heart sounds: Normal heart sounds. No murmur heard.  Pulmonary:      Effort: Pulmonary effort is normal. No respiratory distress.      Breath sounds: Normal breath sounds.   Chest:      Chest wall: No tenderness.   Abdominal:      General: Abdomen is flat. Bowel sounds are normal. There is no distension.      Palpations: Abdomen is soft. There is no mass.      Tenderness: There is no abdominal tenderness. There is no guarding.   Musculoskeletal:         General: No swelling or tenderness. Normal range of motion.      Cervical back: Normal range of motion and neck supple.   Skin:     General: Skin is warm and dry.      Findings: No rash.   Neurological:      General: No focal deficit present.      Mental Status: She is alert and oriented to person, place, and time. Mental status is at baseline.      Gait: Gait normal.   Psychiatric:         Attention and Perception: Attention normal.         Mood and Affect: Mood normal.         Speech: Speech normal.         Behavior: Behavior normal. Behavior is cooperative.         Thought Content: Thought content normal. Thought content does not include suicidal " ideation.         Judgment: Judgment normal.           Result Review :                           Assessment and Plan        Diagnoses and all orders for this visit:    1. Depression, unspecified depression type (Primary)  Comments:  stable on Lexapro 20mg, continue    2. Anxiety  Comments:  stableon Lexapro 20mg, continue    3. Primary hypertension  Comments:  Stable on Zestoretic 10/12.5mg, continue    4. Vertigo  Comments:  stable on Antiver 25mg prn, continue    5. Encounter for screening mammogram for malignant neoplasm of breast  -     Mammo Screening Digital Tomosynthesis Bilateral With CAD; Future              Follow Up     Return in about 6 months (around 4/30/2025).    Patient was given instructions and counseling regarding her condition or for health maintenance advice. Please see specific information pulled into the AVS if appropriate.     Judith Rothmanelle Kel  reports that she has quit smoking. Her smoking use included cigarettes. She has never used smokeless tobacco.

## 2024-11-04 ENCOUNTER — ANESTHESIA EVENT (OUTPATIENT)
Dept: GASTROENTEROLOGY | Facility: HOSPITAL | Age: 44
End: 2024-11-04
Payer: COMMERCIAL

## 2024-11-04 NOTE — ANESTHESIA PREPROCEDURE EVALUATION
Anesthesia Evaluation     Patient summary reviewed and Nursing notes reviewed   no history of anesthetic complications:   NPO Solid Status: > 8 hours  NPO Liquid Status: > 2 hours           Airway   Mallampati: II  TM distance: >3 FB  Neck ROM: full  Dental - normal exam     Pulmonary - negative pulmonary ROS and normal exam   Cardiovascular - normal exam  Exercise tolerance: good (4-7 METS)    ECG reviewed    (+) hypertension (took lisinopril this AM)      Neuro/Psych  (+) dizziness/light headedness (Vertigo), psychiatric history Anxiety and Depression  GI/Hepatic/Renal/Endo - negative ROS     Musculoskeletal (-) negative ROS    Abdominal    Substance History - negative use     OB/GYN negative ob/gyn ROS         Other - negative ROS       ROS/Med Hx Other:     EKG 9/27/2024:  HR 80 bpm  NSR  Probable left atrial enlargement                Anesthesia Plan    ASA 2     general     (Total IV Anesthesia    Patient understands anesthesia not responsible for dental damage.  )  intravenous induction     Anesthetic plan, risks, benefits, and alternatives have been provided, discussed and informed consent has been obtained with: patient.    Plan discussed with CRNA.      CODE STATUS:

## 2024-11-05 ENCOUNTER — HOSPITAL ENCOUNTER (OUTPATIENT)
Facility: HOSPITAL | Age: 44
Setting detail: HOSPITAL OUTPATIENT SURGERY
Discharge: HOME | End: 2024-11-05
Attending: SURGERY | Admitting: SURGERY
Payer: COMMERCIAL

## 2024-11-05 ENCOUNTER — ANESTHESIA (OUTPATIENT)
Dept: GASTROENTEROLOGY | Facility: HOSPITAL | Age: 44
End: 2024-11-05
Payer: COMMERCIAL

## 2024-11-05 VITALS
TEMPERATURE: 97.1 F | DIASTOLIC BLOOD PRESSURE: 81 MMHG | BODY MASS INDEX: 25.46 KG/M2 | WEIGHT: 153 LBS | HEART RATE: 74 BPM | RESPIRATION RATE: 16 BRPM | SYSTOLIC BLOOD PRESSURE: 121 MMHG | OXYGEN SATURATION: 98 %

## 2024-11-05 DIAGNOSIS — Z12.11 SCREENING FOR MALIGNANT NEOPLASM OF COLON: ICD-10-CM

## 2024-11-05 LAB — B-HCG UR QL: NEGATIVE

## 2024-11-05 PROCEDURE — 25810000003 LACTATED RINGERS PER 1000 ML: Performed by: NURSE ANESTHETIST, CERTIFIED REGISTERED

## 2024-11-05 PROCEDURE — 25010000002 PROPOFOL 10 MG/ML EMULSION: Performed by: NURSE ANESTHETIST, CERTIFIED REGISTERED

## 2024-11-05 PROCEDURE — 25010000002 LIDOCAINE PF 2% 2 % SOLUTION: Performed by: NURSE ANESTHETIST, CERTIFIED REGISTERED

## 2024-11-05 PROCEDURE — 88305 TISSUE EXAM BY PATHOLOGIST: CPT | Performed by: SURGERY

## 2024-11-05 PROCEDURE — 81025 URINE PREGNANCY TEST: CPT

## 2024-11-05 RX ORDER — SODIUM CHLORIDE 0.9 % (FLUSH) 0.9 %
3 SYRINGE (ML) INJECTION EVERY 12 HOURS SCHEDULED
Status: DISCONTINUED | OUTPATIENT
Start: 2024-11-05 | End: 2024-11-05 | Stop reason: HOSPADM

## 2024-11-05 RX ORDER — SODIUM CHLORIDE 9 MG/ML
40 INJECTION, SOLUTION INTRAVENOUS AS NEEDED
Status: DISCONTINUED | OUTPATIENT
Start: 2024-11-05 | End: 2024-11-05 | Stop reason: HOSPADM

## 2024-11-05 RX ORDER — SODIUM CHLORIDE, SODIUM LACTATE, POTASSIUM CHLORIDE, CALCIUM CHLORIDE 600; 310; 30; 20 MG/100ML; MG/100ML; MG/100ML; MG/100ML
30 INJECTION, SOLUTION INTRAVENOUS CONTINUOUS
Status: DISCONTINUED | OUTPATIENT
Start: 2024-11-05 | End: 2024-11-05 | Stop reason: HOSPADM

## 2024-11-05 RX ORDER — PROPOFOL 10 MG/ML
VIAL (ML) INTRAVENOUS AS NEEDED
Status: DISCONTINUED | OUTPATIENT
Start: 2024-11-05 | End: 2024-11-05 | Stop reason: SURG

## 2024-11-05 RX ORDER — SODIUM CHLORIDE 0.9 % (FLUSH) 0.9 %
10 SYRINGE (ML) INJECTION AS NEEDED
Status: DISCONTINUED | OUTPATIENT
Start: 2024-11-05 | End: 2024-11-05 | Stop reason: HOSPADM

## 2024-11-05 RX ORDER — LIDOCAINE HYDROCHLORIDE 20 MG/ML
INJECTION, SOLUTION EPIDURAL; INFILTRATION; INTRACAUDAL; PERINEURAL AS NEEDED
Status: DISCONTINUED | OUTPATIENT
Start: 2024-11-05 | End: 2024-11-05 | Stop reason: SURG

## 2024-11-05 RX ADMIN — PROPOFOL 225 MCG/KG/MIN: 10 INJECTION, EMULSION INTRAVENOUS at 13:08

## 2024-11-05 RX ADMIN — SODIUM CHLORIDE, POTASSIUM CHLORIDE, SODIUM LACTATE AND CALCIUM CHLORIDE 30 ML/HR: 600; 310; 30; 20 INJECTION, SOLUTION INTRAVENOUS at 12:52

## 2024-11-05 RX ADMIN — PROPOFOL 70 MG: 10 INJECTION, EMULSION INTRAVENOUS at 13:08

## 2024-11-05 RX ADMIN — LIDOCAINE HYDROCHLORIDE 50 MG: 20 INJECTION, SOLUTION INTRAVENOUS at 13:08

## 2024-11-05 RX ADMIN — SODIUM CHLORIDE, POTASSIUM CHLORIDE, SODIUM LACTATE AND CALCIUM CHLORIDE: 600; 310; 30; 20 INJECTION, SOLUTION INTRAVENOUS at 13:05

## 2024-11-05 NOTE — ANESTHESIA POSTPROCEDURE EVALUATION
Patient: Judith Begum    Procedure Summary       Date: 11/05/24 Room / Location: [facility] ENDOSCOPY 1 / [facility] ENDOSCOPY    Anesthesia Start: 1305 Anesthesia Stop: 1336    Procedure: COLONOSCOPY W/ HOT& COLD SNARE POLYPECTOMIES Diagnosis:       Screening for malignant neoplasm of colon      (Screening for malignant neoplasm of colon [Z12.11])    Surgeons: Fermín Yo MD Provider: Norma Talbot CRNA    Anesthesia Type: general ASA Status: 2            Anesthesia Type: general    Vitals  Vitals Value Taken Time   /81 11/05/24 1348   Temp 36.2 °C (97.1 °F) 11/05/24 1347   Pulse 67 11/05/24 1350   Resp 16 11/05/24 1347   SpO2 98 % 11/05/24 1350   Vitals shown include unfiled device data.        Post Anesthesia Care and Evaluation    Post-procedure mental status: acceptable.  Pain management: satisfactory to patient    Airway patency: patent  Anesthetic complications: No anesthetic complications    Cardiovascular status: acceptable  Respiratory status: acceptable    Comments: Per chart review

## 2024-11-05 NOTE — H&P
General Surgery/Colorectal Surgery Note    Patient Name:  Judith Begum  YOB: 1980  [id number]    Referring Provider: Fermín Yo, *      Patient Care Team:  Vanessa Montalvo APRN as PCP - General (Nurse Practitioner)  William Rae MD as Consulting Physician (Obstetrics and Gynecology)  Stacey Yoon APRN as Nurse Practitioner (Nurse Practitioner)    Subjective .     History of present illness:    HPI   referral from Vanessa Gardner for colonoscopy consultation. Patient denies any abdominal pain, change in bowel habit, or rectal bleeding. Admits to family history of colon cancer with her maternal and paternal grand mother. No previous colonoscopy.     History:  Past Medical History:   Diagnosis Date    ADD (attention deficit disorder)     Anxiety     Depression        Past Surgical History:   Procedure Laterality Date    DILATATION AND CURETTAGE  2009    KNEE ACL RECONSTRUCTION  2011    THYROID SURGERY         Family History   Problem Relation Age of Onset    Stroke Father     Lung cancer Father     Colon cancer Maternal Grandmother     Diabetes Maternal Grandmother     Lung cancer Paternal Grandfather     Breast cancer Other        Social History     Tobacco Use    Smoking status: Former     Types: Cigarettes    Smokeless tobacco: Never   Vaping Use    Vaping status: Never Used   Substance Use Topics    Alcohol use: Never    Drug use: Never       Review of Systems: See HPI    Review of Systems            Medications Prior to Admission   Medication Sig Dispense Refill Last Dose/Taking    clindamycin (CLEOCIN T) 1 % lotion        Dupilumab (Dupixent) 300 MG/2ML solution prefilled syringe 2 mL.       escitalopram (Lexapro) 20 MG tablet Take 1/2 tab 10mg q day for 2 weeks then 1 tab 20mg q day 90 tablet 1     lisinopril-hydrochlorothiazide (Zestoretic) 10-12.5 MG per tablet Take 1 tablet by mouth 2 (Two) Times a Day. 180 tablet 1     meclizine (ANTIVERT) 25 MG tablet  Take 2 tablets by mouth 3 (Three) Times a Day As Needed for Dizziness. 20 tablet 0     sodium-potassium-magnesium sulfates (Suprep Bowel Prep Kit) 17.5-3.13-1.6 GM/177ML solution oral solution Take as directed.  Instructions given in office.  Dispense: 2 bottles 354 mL 0     tacrolimus (PROTOPIC) 0.1 % ointment             Home Meds:      Prior to Admission medications    Medication Sig Start Date End Date Taking? Authorizing Provider   clindamycin (CLEOCIN T) 1 % lotion  9/25/23   Mayi Henderson MD   Dupilumab (Dupixent) 300 MG/2ML solution prefilled syringe 2 mL.    Mayi Henderson MD   escitalopram (Lexapro) 20 MG tablet Take 1/2 tab 10mg q day for 2 weeks then 1 tab 20mg q day 10/1/24   Vanessa Montalvo APRN   lisinopril-hydrochlorothiazide (Zestoretic) 10-12.5 MG per tablet Take 1 tablet by mouth 2 (Two) Times a Day. 10/1/24   Vanessa Montalvo APRN   meclizine (ANTIVERT) 25 MG tablet Take 2 tablets by mouth 3 (Three) Times a Day As Needed for Dizziness. 9/27/24   Alfie Caldwell PA-C   sodium-potassium-magnesium sulfates (Suprep Bowel Prep Kit) 17.5-3.13-1.6 GM/177ML solution oral solution Take as directed.  Instructions given in office.  Dispense: 2 bottles 10/1/24   Stacey Yoon APRN   tacrolimus (PROTOPIC) 0.1 % ointment  9/22/23   Mayi Henderson MD            Allergies:  Latex      Objective     Vital Signs        Physical Exam:     Physical Exam    NAD, A/O x 4, normal circulation, normal respiration      Result Review :Labs  Result Review  Imaging  Med Tab  Media    Assessment & Plan     Diagnoses and all orders for this visit:    1. Screening for malignant neoplasm of colon  -     sodium chloride 0.9 % flush 3 mL  -     sodium chloride 0.9 % flush 10 mL  -     sodium chloride 0.9 % infusion 40 mL    Other orders  -     Follow Anesthesia Guidelines / Protocol; Standing  -     Pregnancy, Urine - Urine, Clean Catch; Standing  -     Continuous Pulse Oximetry; Standing  -     POC  Glucose Once; Standing  -     Insert Peripheral IV; Standing  -     lactated ringers infusion  -     Continuous Pulse Oximetry  -     POC Glucose Once  -     Insert Peripheral IV  -     Follow Anesthesia Guidelines / Protocol; Standing  -     Obtain Informed Consent; Standing  -     Verify Bowel Prep Was Successful; Standing  -     Give Tap Water Enema If Bowel Prep Insufficient; Standing  -     hCG, Serum, Qualitative; Standing  -     Insert Peripheral IV; Standing  -     Saline Lock & Maintain IV Access; Standing  -     Place Sequential Compression Device; Standing  -     Maintain Sequential Compression Device; Standing  -     Verify NPO Status; Standing  -     Follow Anesthesia Guidelines / Protocol  -     Pregnancy, Urine - Urine, Clean Catch  -     Follow Anesthesia Guidelines / Protocol  -     Obtain Informed Consent  -     Verify Bowel Prep Was Successful  -     Give Tap Water Enema If Bowel Prep Insufficient  -     hCG, Serum, Qualitative  -     Insert Peripheral IV  -     Saline Lock & Maintain IV Access  -     Place Sequential Compression Device  -     Maintain Sequential Compression Device  -     Verify NPO Status           Risks including bleeding, perforation, pain, infection, missed polyp(s). Benefits, and alternatives of Colonoscopy discussed with patient.  All questions answered.  Consent verified.         Fermín Yo MD  11/05/24 12:29 EST

## 2024-11-08 ENCOUNTER — TELEPHONE (OUTPATIENT)
Dept: SURGERY | Facility: CLINIC | Age: 44
End: 2024-11-08
Payer: COMMERCIAL

## 2024-11-08 LAB
CYTO UR: NORMAL
LAB AP CASE REPORT: NORMAL
LAB AP CLINICAL INFORMATION: NORMAL
PATH REPORT.FINAL DX SPEC: NORMAL
PATH REPORT.GROSS SPEC: NORMAL

## 2024-11-08 NOTE — TELEPHONE ENCOUNTER
----- Message from Fermín Yo sent at 11/8/2024  1:40 PM EST -----  Have her follow-up with me instead of April.  ----- Message -----  From: Lab, Background User  Sent: 11/8/2024  10:52 AM EST  To: Fermín Yo MD

## 2024-11-08 NOTE — TELEPHONE ENCOUNTER
CAN YOU SCHEDULE PATIENT FOR 11-21 @ 1:30 WITH DR TORRES? IT IS TO DISCUSS TISSUE PATHOLOGY RESULTS. PATIENT IS AWARE OF TIME AND DATE

## 2024-11-21 ENCOUNTER — OFFICE VISIT (OUTPATIENT)
Dept: SURGERY | Facility: CLINIC | Age: 44
End: 2024-11-21
Payer: COMMERCIAL

## 2024-11-21 ENCOUNTER — TRANSCRIBE ORDERS (OUTPATIENT)
Dept: ADMINISTRATIVE | Facility: HOSPITAL | Age: 44
End: 2024-11-21
Payer: COMMERCIAL

## 2024-11-21 ENCOUNTER — PREP FOR SURGERY (OUTPATIENT)
Dept: OTHER | Facility: HOSPITAL | Age: 44
End: 2024-11-21
Payer: COMMERCIAL

## 2024-11-21 VITALS — WEIGHT: 153 LBS | HEIGHT: 65 IN | BODY MASS INDEX: 25.49 KG/M2

## 2024-11-21 DIAGNOSIS — R22.1 NODULE OF NECK: Primary | ICD-10-CM

## 2024-11-21 DIAGNOSIS — A63.0 ANAL CONDYLOMA: Primary | ICD-10-CM

## 2024-11-21 RX ORDER — SODIUM CHLORIDE 0.9 % (FLUSH) 0.9 %
10 SYRINGE (ML) INJECTION EVERY 12 HOURS SCHEDULED
OUTPATIENT
Start: 2024-11-21

## 2024-11-21 RX ORDER — SODIUM CHLORIDE, SODIUM LACTATE, POTASSIUM CHLORIDE, CALCIUM CHLORIDE 600; 310; 30; 20 MG/100ML; MG/100ML; MG/100ML; MG/100ML
50 INJECTION, SOLUTION INTRAVENOUS CONTINUOUS
OUTPATIENT
Start: 2024-11-21 | End: 2024-11-22

## 2024-11-21 RX ORDER — SODIUM PHOSPHATE,MONO-DIBASIC 19G-7G/118
1 ENEMA (ML) RECTAL ONCE
OUTPATIENT
Start: 2024-11-21 | End: 2024-11-21

## 2024-11-21 RX ORDER — SODIUM CHLORIDE 0.9 % (FLUSH) 0.9 %
10 SYRINGE (ML) INJECTION AS NEEDED
OUTPATIENT
Start: 2024-11-21

## 2024-11-21 RX ORDER — SODIUM CHLORIDE 9 MG/ML
40 INJECTION, SOLUTION INTRAVENOUS AS NEEDED
OUTPATIENT
Start: 2024-11-21

## 2024-11-21 NOTE — LETTER
November 23, 2024     RIVERA Valle  [address] [facility]  [address]  [place] [address]    Patient: Judith Begum   YOB: 1980   Date of Visit: 11/21/2024     Dear RIVERA Valle:       Thank you for referring Judith Begum to me for evaluation. Below are the relevant portions of my assessment and plan of care.    If you have questions, please do not hesitate to call me. I look forward to following Judith along with you.         Sincerely,        Fermín Yo MD        CC: No Recipients    Fermín Yo MD  11/23/24 1258  Sign when Signing Visit  General Surgery/Colorectal Surgery Note    Patient Name:  Judith Begum  YOB: 1980  [id number]    Referring Provider: No ref. provider found      Patient Care Team:  Vanessa Montalvo APRN as PCP - General (Nurse Practitioner)  William Rae MD as Consulting Physician (Obstetrics and Gynecology)  Stacey Yoon APRN as Nurse Practitioner (Nurse Practitioner)    Chief complaint discuss colonoscopy    Subjective.     History of present illness:    Status post colonoscopy 11/5/2024 with colon polyp removed, polyp in the anus removed.  Biopsy with sessile serrated lesion in the transverse colon, anal polyp with condyloma.    She comes in for follow-up.  No history of abnormal Pap smear.  Her Pap smear is up-to-date.  No testing for HIV in the past.  No blood thinner use.  No chest pain.      History:  Past Medical History:   Diagnosis Date   • ADD (attention deficit disorder)    • Anxiety    • Depression        Past Surgical History:   Procedure Laterality Date   • COLONOSCOPY N/A 11/5/2024    Procedure: COLONOSCOPY W/ HOT& COLD SNARE POLYPECTOMIES;  Surgeon: Fermín Yo MD;  Location: [facility] ENDOSCOPY;  Service: General;  Laterality: N/A;  COLON POLYP, DIVERTICULOSIS, ANAL POLYP   • DILATATION AND CURETTAGE  2009   • KNEE ACL RECONSTRUCTION  2011   • THYROID SURGERY         Family  History   Problem Relation Age of Onset   • Stroke Father    • Lung cancer Father    • Colon cancer Maternal Grandmother    • Diabetes Maternal Grandmother    • Lung cancer Paternal Grandfather    • Breast cancer Other        Social History     Tobacco Use   • Smoking status: Former     Types: Cigarettes   • Smokeless tobacco: Never   Vaping Use   • Vaping status: Never Used   Substance Use Topics   • Alcohol use: Never   • Drug use: Never       Review of Systems  All systems were reviewed and negative except for:   Review of Systems   Constitutional: Negative for chills, fever and unexpected weight loss.   HENT: Negative for congestion, nosebleeds and voice change.    Eyes: Negative for blurred vision, double vision and discharge.   Respiratory: Negative for apnea, chest tightness and shortness of breath.    Cardiovascular: Negative for chest pain and leg swelling.   Gastrointestinal:        See HPI   Endocrine: Negative for cold intolerance and heat intolerance.   Genitourinary: Negative for dysuria, hematuria and urgency.   Musculoskeletal: Negative for back pain, joint swelling and neck pain.   Skin: Negative for color change and dry skin.   Neurological: Negative for dizziness and confusion.   Hematological: Negative for adenopathy.   Psychiatric/Behavioral: Negative for agitation and behavioral problems.     MEDS:  Prior to Admission medications    Medication Sig Start Date End Date Taking? Authorizing Provider   clindamycin (CLEOCIN T) 1 % lotion  9/25/23  Yes ProviderMayi MD   Dupilumab (Dupixent) 300 MG/2ML solution prefilled syringe 2 mL.   Yes ProviderMayi MD   escitalopram (Lexapro) 20 MG tablet Take 1/2 tab 10mg q day for 2 weeks then 1 tab 20mg q day 10/1/24  Yes Vanessa Montalvo APRN   lisinopril-hydrochlorothiazide (Zestoretic) 10-12.5 MG per tablet Take 1 tablet by mouth 2 (Two) Times a Day. 10/1/24  Yes Vanessa Montalvo APRN   meclizine (ANTIVERT) 25 MG tablet Take 2 tablets by  "mouth 3 (Three) Times a Day As Needed for Dizziness. 9/27/24  Yes Alfie Caldwell PA-C   tacrolimus (PROTOPIC) 0.1 % ointment  9/22/23  Yes Provider, MD Mayi        Allergies:  Latex    Objective    Vital Signs        Physical Exam:     General Appearance:    Alert, cooperative, in no acute distress   Head:    Normocephalic, without obvious abnormality, atraumatic   Eyes:          Conjunctivae and sclerae normal, no icterus,     Ears:    Ears appear intact with no abnormalities noted   Throat:   No oral lesions, no thrush, oral mucosa moist   Neck:   No adenopathy, supple, trachea midline, no thyromegaly   Back:     No kyphosis present, no scoliosis present, no skin lesions,      erythema or scars, no tenderness to percussion or                   palpation,   range of motion normal   Lungs:     Clear to auscultation,respirations regular, even and                  unlabored    Heart:    Regular rhythm and normal rate, normal S1 and S2, no            murmur, no gallop, no rub, no click   Chest Wall:    No abnormalities observed   Abdomen:     Normal bowel sounds, no masses, no organomegaly, soft        non-tender, non-distended, no guarding, no rebound                tenderness   Rectal:        Extremities:   Moves all extremities well, no edema, no cyanosis, no             redness   Pulses:   Pulses palpable and equal bilaterally   Skin:   No bleeding, bruising or rash   Lymph nodes:   No palpable adenopathy   Neurologic:   A/o x 4 with no deficits       Results Review:   {Results Review:55380::\"I reviewed the patient's new clinical results.\"    LABS/IMAGING:  Results for orders placed or performed during the hospital encounter of 11/05/24   Pregnancy, Urine - Urine, Clean Catch    Collection Time: 11/05/24 12:32 PM    Specimen: Urine, Clean Catch   Result Value Ref Range    HCG, Urine QL Negative Negative   Tissue Pathology Exam    Collection Time: 11/05/24  1:16 PM    Specimen: A: Large Intestine, " "Transverse Colon; Polyp    B: Anus; Polyp   Result Value Ref Range    Case Report       Surgical Pathology Report                         Case: [id number]                                  Authorizing Provider:  Fermín Yo MD  Collected:           11/05/2024 01:16 PM          Ordering Location:     [facility] Received:            11/06/2024 08:43 AM                                 SUITES                                                                       Pathologist:           Flor Edwards DO                                                       Specimens:   1) - Large Intestine, Transverse Colon, transverse colon polyp                                      2) - Anus, ANAL POLYP                                                                      Clinical Information       Screening for malignant neoplasm of colon      Final Diagnosis       1. Transverse colon polyp, biopsy:   - Fragments of sessile serrated lesion      2.  Anal polyp, excision:   - Squamous mucosa with focal features suggestive of condyloma      Gross Description       1. Large Intestine, Transverse Colon.  Received in formalin and labeled \" transverse colon polyp\" is a 0.7 cm aggregate of tan soft tissue fragments. The specimen is entirely submitted in one cassette.    2. Anus.  Received in formalin and labeled \" anal polyp\" are two fragments of tan soft tissue measuring 0.2-0.3 cm in greatest dimension. The specimen is entirely submitted in one cassette.   CHAYA      Microscopic Description       Microscopic examination performed.          Result Review: Labs  Result Review  Imaging  Med Tab  Media     Assessment & Plan    Status post colonoscopy 11/5/2024 with colon polyp removed, polyp in the anus removed.  Biopsy with sessile serrated lesion in the transverse colon, anal polyp with condyloma.    Discussion with patient.  I reviewed the colonoscopy findings with the patient.  I reviewed the pathology.  Next " colonoscopy in 5 years.  I recommended HIV testing with anal condyloma found on her biopsy.  I recommended excision and fulguration of her anal condyloma with biopsies of her anal transition zone to evaluate for cancer or precancerous changes.  Procedure and recovery discussed.  Benefits and alternatives discussed.  Risk procedure including risk of anesthesia, bleeding, infection, recurrence, pain, heart attack, stroke, blood clot, pneumonia discussed.  All questions answered.  She agrees with the plan.  Enema on the day of the procedure.  Thank you for the consult.           This document has been electronically signed by Fermín Yo MD  November 23, 2024 12:55 EST

## 2024-11-23 NOTE — PROGRESS NOTES
General Surgery/Colorectal Surgery Note    Patient Name:  Judith Begum  YOB: 1980  [id number]    Referring Provider: No ref. provider found      Patient Care Team:  Vanessa Montalvo APRN as PCP - General (Nurse Practitioner)  William Rae MD as Consulting Physician (Obstetrics and Gynecology)  Stacey Yoon APRN as Nurse Practitioner (Nurse Practitioner)    Chief complaint discuss colonoscopy    Subjective .     History of present illness:    Status post colonoscopy 11/5/2024 with colon polyp removed, polyp in the anus removed.  Biopsy with sessile serrated lesion in the transverse colon, anal polyp with condyloma.    She comes in for follow-up.  No history of abnormal Pap smear.  Her Pap smear is up-to-date.  No testing for HIV in the past.  No blood thinner use.  No chest pain.      History:  Past Medical History:   Diagnosis Date    ADD (attention deficit disorder)     Anxiety     Depression        Past Surgical History:   Procedure Laterality Date    COLONOSCOPY N/A 11/5/2024    Procedure: COLONOSCOPY W/ HOT& COLD SNARE POLYPECTOMIES;  Surgeon: Fermín Yo MD;  Location: [facility] ENDOSCOPY;  Service: General;  Laterality: N/A;  COLON POLYP, DIVERTICULOSIS, ANAL POLYP    DILATATION AND CURETTAGE  2009    KNEE ACL RECONSTRUCTION  2011    THYROID SURGERY         Family History   Problem Relation Age of Onset    Stroke Father     Lung cancer Father     Colon cancer Maternal Grandmother     Diabetes Maternal Grandmother     Lung cancer Paternal Grandfather     Breast cancer Other        Social History     Tobacco Use    Smoking status: Former     Types: Cigarettes    Smokeless tobacco: Never   Vaping Use    Vaping status: Never Used   Substance Use Topics    Alcohol use: Never    Drug use: Never       Review of Systems  All systems were reviewed and negative except for:   Review of Systems   Constitutional: Negative for chills, fever and unexpected weight loss.   HENT:  Negative for congestion, nosebleeds and voice change.    Eyes: Negative for blurred vision, double vision and discharge.   Respiratory: Negative for apnea, chest tightness and shortness of breath.    Cardiovascular: Negative for chest pain and leg swelling.   Gastrointestinal:        See HPI   Endocrine: Negative for cold intolerance and heat intolerance.   Genitourinary: Negative for dysuria, hematuria and urgency.   Musculoskeletal: Negative for back pain, joint swelling and neck pain.   Skin: Negative for color change and dry skin.   Neurological: Negative for dizziness and confusion.   Hematological: Negative for adenopathy.   Psychiatric/Behavioral: Negative for agitation and behavioral problems.     MEDS:  Prior to Admission medications    Medication Sig Start Date End Date Taking? Authorizing Provider   clindamycin (CLEOCIN T) 1 % lotion  9/25/23  Yes ProviderMayi MD   Dupilumab (Dupixent) 300 MG/2ML solution prefilled syringe 2 mL.   Yes Provider, MD Mayi   escitalopram (Lexapro) 20 MG tablet Take 1/2 tab 10mg q day for 2 weeks then 1 tab 20mg q day 10/1/24  Yes Vanessa Montalvo APRN   lisinopril-hydrochlorothiazide (Zestoretic) 10-12.5 MG per tablet Take 1 tablet by mouth 2 (Two) Times a Day. 10/1/24  Yes Vanessa Montalvo APRN   meclizine (ANTIVERT) 25 MG tablet Take 2 tablets by mouth 3 (Three) Times a Day As Needed for Dizziness. 9/27/24  Yes Alfie Caldwell PA-C   tacrolimus (PROTOPIC) 0.1 % ointment  9/22/23  Yes Provider, MD Mayi        Allergies:  Latex    Objective     Vital Signs        Physical Exam:     General Appearance:    Alert, cooperative, in no acute distress   Head:    Normocephalic, without obvious abnormality, atraumatic   Eyes:          Conjunctivae and sclerae normal, no icterus,     Ears:    Ears appear intact with no abnormalities noted   Throat:   No oral lesions, no thrush, oral mucosa moist   Neck:   No adenopathy, supple, trachea midline, no thyromegaly  "  Back:     No kyphosis present, no scoliosis present, no skin lesions,      erythema or scars, no tenderness to percussion or                   palpation,   range of motion normal   Lungs:     Clear to auscultation,respirations regular, even and                  unlabored    Heart:    Regular rhythm and normal rate, normal S1 and S2, no            murmur, no gallop, no rub, no click   Chest Wall:    No abnormalities observed   Abdomen:     Normal bowel sounds, no masses, no organomegaly, soft        non-tender, non-distended, no guarding, no rebound                tenderness   Rectal:        Extremities:   Moves all extremities well, no edema, no cyanosis, no             redness   Pulses:   Pulses palpable and equal bilaterally   Skin:   No bleeding, bruising or rash   Lymph nodes:   No palpable adenopathy   Neurologic:   A/o x 4 with no deficits       Results Review:   {Results Review:02211::\"I reviewed the patient's new clinical results.\"    LABS/IMAGING:  Results for orders placed or performed during the hospital encounter of 11/05/24   Pregnancy, Urine - Urine, Clean Catch    Collection Time: 11/05/24 12:32 PM    Specimen: Urine, Clean Catch   Result Value Ref Range    HCG, Urine QL Negative Negative   Tissue Pathology Exam    Collection Time: 11/05/24  1:16 PM    Specimen: A: Large Intestine, Transverse Colon; Polyp    B: Anus; Polyp   Result Value Ref Range    Case Report       Surgical Pathology Report                         Case: [id number]                                  Authorizing Provider:  Fermín Yo MD  Collected:           11/05/2024 01:16 PM          Ordering Location:     [facility] Received:            11/06/2024 08:43 AM                                 SUITES                                                                       Pathologist:           Flor Edwards DO                                                       Specimens:   1) - Large Intestine, " "Transverse Colon, transverse colon polyp                                      2) - Anus, ANAL POLYP                                                                      Clinical Information       Screening for malignant neoplasm of colon      Final Diagnosis       1. Transverse colon polyp, biopsy:   - Fragments of sessile serrated lesion      2.  Anal polyp, excision:   - Squamous mucosa with focal features suggestive of condyloma      Gross Description       1. Large Intestine, Transverse Colon.  Received in formalin and labeled \" transverse colon polyp\" is a 0.7 cm aggregate of tan soft tissue fragments. The specimen is entirely submitted in one cassette.    2. Anus.  Received in formalin and labeled \" anal polyp\" are two fragments of tan soft tissue measuring 0.2-0.3 cm in greatest dimension. The specimen is entirely submitted in one cassette.   CHAYA      Microscopic Description       Microscopic examination performed.          Result Review : Labs  Result Review  Imaging  Med Tab  Media     Assessment & Plan     Status post colonoscopy 11/5/2024 with colon polyp removed, polyp in the anus removed.  Biopsy with sessile serrated lesion in the transverse colon, anal polyp with condyloma.    Discussion with patient.  I reviewed the colonoscopy findings with the patient.  I reviewed the pathology.  Next colonoscopy in 5 years.  I recommended HIV testing with anal condyloma found on her biopsy.  I recommended excision and fulguration of her anal condyloma with biopsies of her anal transition zone to evaluate for cancer or precancerous changes.  Procedure and recovery discussed.  Benefits and alternatives discussed.  Risk procedure including risk of anesthesia, bleeding, infection, recurrence, pain, heart attack, stroke, blood clot, pneumonia discussed.  All questions answered.  She agrees with the plan.  Enema on the day of the procedure.  Thank you for the consult.           This document has been electronically " signed by Fermín Yo MD  November 23, 2024 12:55 EST

## 2024-12-02 ENCOUNTER — TELEPHONE (OUTPATIENT)
Dept: SURGERY | Facility: CLINIC | Age: 44
End: 2024-12-02
Payer: COMMERCIAL

## 2024-12-02 NOTE — TELEPHONE ENCOUNTER
----- Message from Mireya PASCUAL sent at 11/21/2024  1:49 PM EST -----  CALL TO SCHEDULE SURGERY WEDNESDAY

## 2024-12-23 ENCOUNTER — HOSPITAL ENCOUNTER (OUTPATIENT)
Dept: ULTRASOUND IMAGING | Facility: HOSPITAL | Age: 44
Discharge: HOME | End: 2024-12-23
Admitting: OBSTETRICS & GYNECOLOGY
Payer: COMMERCIAL

## 2024-12-23 DIAGNOSIS — R22.1 NODULE OF NECK: ICD-10-CM

## 2024-12-23 PROCEDURE — 76536 US EXAM OF HEAD AND NECK: CPT

## 2025-01-09 ENCOUNTER — TELEPHONE (OUTPATIENT)
Dept: SURGERY | Facility: CLINIC | Age: 45
End: 2025-01-09
Payer: COMMERCIAL

## 2025-01-09 NOTE — TELEPHONE ENCOUNTER
PATIENT IS RESCHEDULED FOR 02-07-25, SCHEDULED WITH CAROL IN SURGERY SCHEDULING. PATIENT AWARE OF DATE AND VOICED UNDERSTANDING.

## 2025-01-14 ENCOUNTER — TELEPHONE (OUTPATIENT)
Dept: SURGERY | Facility: CLINIC | Age: 45
End: 2025-01-14
Payer: COMMERCIAL

## 2025-01-14 NOTE — TELEPHONE ENCOUNTER
PT IS SCHEDULED FOR ANAL CONDYLOMA EXCISION ON 2/7. 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 HER WORK NEEDS TO KNOW AN EXPECTED RECOVERY TIME.

## 2025-01-29 ENCOUNTER — TELEPHONE (OUTPATIENT)
Dept: SURGERY | Facility: CLINIC | Age: 45
End: 2025-01-29
Payer: COMMERCIAL

## 2025-01-29 ENCOUNTER — TELEPHONE (OUTPATIENT)
Dept: FAMILY MEDICINE CLINIC | Facility: CLINIC | Age: 45
End: 2025-01-29
Payer: COMMERCIAL

## 2025-01-29 NOTE — TELEPHONE ENCOUNTER
Hub staff attempted to follow warm transfer process and was unsuccessful     Caller: Judith Begum    Relationship to patient: Self    Best call back number: [phone]  PT CAN BE REACHED AT ANYTIME, IF NO ANSWER A DETAILED MSG CAN BE LEFT.    Patient is needing: PT HAS HAD THE FLU (MOSTLY OVER IT), SHE IS STILL CONGESTED. SHE IS ASKING IF THERE IS ANYTHING SHE SHOULD/ CAN DO BEFORE HER SURGERY PROCEDURE?

## 2025-01-29 NOTE — TELEPHONE ENCOUNTER
Caller: Judith Begum    Relationship: Self    Best call back number: [phone]    What is the best time to reach you: ANY    Who are you requesting to speak with (clinical staff, provider,  specific staff member): CLINICAL       What was the call regarding: PATIENT HAS AN UPCOMING SURGERY BUT IS FACING SOME CONGESTION ISSUES. SHE SPOKE TO THE SURGEON ABOUT THESE CONCERNS AND SURGEON DID NOT SEEM CONCERNED. SHE WOULD LIKE TO ASK RIVERA FREEMAN ABOUT IT TO SEE HOW SHE FEELS ABOUT PATIENT HAVING A SURGERY WHILE BEING CONGESTED.

## 2025-01-30 NOTE — TELEPHONE ENCOUNTER
S/W pt. Pt thinks she had the flu 1-2 wks ago and still has symptoms. Advised pt of OTC symptom management. Ultimately it would be up to the surgeon if okay to proceed with procedure.

## 2025-02-06 NOTE — PRE-PROCEDURE INSTRUCTIONS
PATIENT INSTRUCTED TO BE:    - NOTHING TO EAT AFTER MIDNIGHT OR CHEW, EXCEPT CAN HAVE CLEAR LIQUIDS 2 HOURS PRIOR TO SURGERY ARRIVAL TIME , NO MORE THAN 8 OZ. (NOTHING RED)     - TO HOLD ALL VITAMINS, SUPPLEMENTS, NSAIDS FOR ONE WEEK PRIOR TO THEIR SURGICAL PROCEDURE  - INSTRUCTED PT TO USE SURGICAL SOAP 1 TIME  AM OF SURGERY __2/7/25___________   USE THE SOAP FROM NECK TO TOES, AVOID THEIR FACE, HAIR, AND PRIVATE PARTS. IF USE THE SOAP THE NIGHT PRIOR TO SURGERY, CHANGE BED LINENS AND NO PETS IN THE BED.     INSTRUCTED NO LOTIONS, JEWELRY, PIERCINGS,  NAIL POLISH, OR DEODORANT DAY OF SURGERY  -INSTRUCTED TO TAKE THE FOLLOWING MEDICATIONS THE DAY OF SURGERY WITH SIPS OF WATER: LEXAPRO    - DO NOT BRING ANY MEDICATIONS WITH YOU TO THE HOSPITAL THE DAY OF SURGERY, EXCEPT IF USE INHALERS. BRING INHALERS DAY OF SURGERY    - DO NOT SMOKE OR VAPE 24 HOURS PRIOR TO PROCEDURE PER ANESTHESIA REQUEST     -MAKE SURE YOU HAVE A RIDE HOME OR SOMEONE TO STAY WITH YOU THE DAY OF THE PROCEDURE AFTER YOU GO HOME     - FOLLOW ANY OTHER INSTRUCTIONS GIVEN TO YOU BY YOUR SURGEON'S OFFICE.     DAY OF SURGERY __2/7/25_ AT [facility] ([facility]),  PARK IN THE OPEN LOT, COME TO ENTRANCE / [facility], FIRST FLOOR, CHECK IN AT THE DESK FOR REGISTRATION/ SURGERY     - YOU WILL RECEIVE A PHONE CALL THE DAY PRIOR TO SURGERY BETWEEN 1PM AND 4 PM WITH ARRIVAL TIME, IF YOUR SURGERY IS ON A MONDAY YOU WILL RECEIVE A CALL THE FRIDAY PRIOR TO SURGERY DATE    - BRING CASH OR CREDIT CARD FOR COPAYMENT OF MEDICATIONS AFTER SURGERY IF YOU USE THE HOSPITAL PHARMACY (MEDS TO BED)    - PREADMISSION TESTING NURSE MAGNUS Gandara [phone]  IF HAVE ANY QUESTIONS     -PATIENT PROVIDED THE NUMBER FOR PREOP SURGICAL DEPT IF HAD QUESTIONS AFTER HOURS PRIOR TO SURGERY ( [phone]).  INFORMED PT IF NO ANSWER, LEAVE A MESSAGE AND SOMEONE WILL RETURN THEIR CALL       PATIENT VERBALIZED UNDERSTANDING       Clear Liquid Diet        Find out when you  need to start a clear liquid diet.   Think of “clear liquids” as anything you could read a newspaper through. This includes things like water, broth, sports drinks, or tea WITHOUT any kind of milk or cream.           Once you are told to start a clear liquid diet, only drink these things until 2 hours before arrival to the hospital or when the hospital says to stop. Total volume limitation: 8 oz.       Clear liquids you CAN drink:   Water   Clear broth: beef, chicken, vegetable, or bone broth with nothing in it   Gatorade   Lemonade or [place]-aid   Soda   Tea, coffee (NO cream or honey)   Jell-O (without fruit)   Popsicles (without fruit or cream)   Italian ices   Juice without pulp: apple, white, grape   You may use salt, pepper, and sugar  NO RED  NO NOODLES    Do NOT drink:   Milk or cream   Soy milk, almond milk, coconut milk, or other non-dairy drinks and   creamers   Milkshakes or smoothies   Tomato juice   Orange juice   Grapefruit juice   Cream soups or any other than broth         Clear Liquid Diet:  Do NOT eat any solid food.  Do NOT eat or suck on mints or candy.  Do NOT chew gum.  Do NOT drink thick liquids like milk or juice with pulp in it.  Do NOT add milk, cream, or anything like soy milk or almond milk to coffee or tea.

## 2025-02-07 ENCOUNTER — ANESTHESIA EVENT (OUTPATIENT)
Dept: PERIOP | Facility: HOSPITAL | Age: 45
End: 2025-02-07
Payer: COMMERCIAL

## 2025-02-07 ENCOUNTER — ANESTHESIA (OUTPATIENT)
Dept: PERIOP | Facility: HOSPITAL | Age: 45
End: 2025-02-07
Payer: COMMERCIAL

## 2025-02-07 ENCOUNTER — HOSPITAL ENCOUNTER (OUTPATIENT)
Facility: HOSPITAL | Age: 45
Setting detail: HOSPITAL OUTPATIENT SURGERY
Discharge: HOME | End: 2025-02-07
Attending: SURGERY | Admitting: SURGERY
Payer: COMMERCIAL

## 2025-02-07 VITALS
BODY MASS INDEX: 25.49 KG/M2 | HEIGHT: 65 IN | DIASTOLIC BLOOD PRESSURE: 88 MMHG | OXYGEN SATURATION: 90 % | TEMPERATURE: 98.4 F | RESPIRATION RATE: 21 BRPM | SYSTOLIC BLOOD PRESSURE: 123 MMHG | HEART RATE: 84 BPM | WEIGHT: 153 LBS

## 2025-02-07 DIAGNOSIS — A63.0 ANAL CONDYLOMA: ICD-10-CM

## 2025-02-07 LAB
B-HCG UR QL: NEGATIVE
HIV 1+2 AB+HIV1P24 AG SERPLBLD IA.RAPID: NORMAL
HIV 1+2 AB+HIV1P24 AG SERPLBLD IA.RAPID: NORMAL

## 2025-02-07 PROCEDURE — G0475 HIV COMBINATION ASSAY: HCPCS | Performed by: SURGERY

## 2025-02-07 PROCEDURE — 81025 URINE PREGNANCY TEST: CPT | Performed by: ANESTHESIOLOGY

## 2025-02-07 PROCEDURE — 25010000002 HYDROMORPHONE 1 MG/ML SOLUTION

## 2025-02-07 PROCEDURE — 25010000002 BUPIVACAINE (PF) 0.25 % SOLUTION: Performed by: SURGERY

## 2025-02-07 PROCEDURE — 25010000002 ONDANSETRON PER 1 MG

## 2025-02-07 PROCEDURE — 63710000001 PROMETHAZINE PER 12.5 MG: Performed by: ANESTHESIOLOGY

## 2025-02-07 PROCEDURE — 25810000003 LACTATED RINGERS PER 1000 ML: Performed by: ANESTHESIOLOGY

## 2025-02-07 PROCEDURE — 25010000002 PROPOFOL 10 MG/ML EMULSION

## 2025-02-07 PROCEDURE — 25010000002 BUPRENORPHINE PER 0.1 MG: Performed by: SURGERY

## 2025-02-07 PROCEDURE — 88341 IMHCHEM/IMCYTCHM EA ADD ANTB: CPT | Performed by: SURGERY

## 2025-02-07 PROCEDURE — 46924 DESTRUCTION ANAL LESION(S): CPT | Performed by: SURGERY

## 2025-02-07 PROCEDURE — 88305 TISSUE EXAM BY PATHOLOGIST: CPT | Performed by: SURGERY

## 2025-02-07 PROCEDURE — 25010000002 DEXAMETHASONE SODIUM PHOSPHATE 100 MG/10ML SOLUTION: Performed by: SURGERY

## 2025-02-07 PROCEDURE — 25010000002 CEFAZOLIN PER 500 MG: Performed by: SURGERY

## 2025-02-07 PROCEDURE — 25010000002 LIDOCAINE PF 2% 2 % SOLUTION

## 2025-02-07 PROCEDURE — 25010000002 MIDAZOLAM PER 1MG: Performed by: ANESTHESIOLOGY

## 2025-02-07 PROCEDURE — 25010000002 KETOROLAC TROMETHAMINE PER 15 MG

## 2025-02-07 PROCEDURE — 25010000002 DEXAMETHASONE PER 1 MG

## 2025-02-07 PROCEDURE — 88342 IMHCHEM/IMCYTCHM 1ST ANTB: CPT | Performed by: SURGERY

## 2025-02-07 RX ORDER — PROMETHAZINE HYDROCHLORIDE 25 MG/1
25 SUPPOSITORY RECTAL ONCE AS NEEDED
Status: DISCONTINUED | OUTPATIENT
Start: 2025-02-07 | End: 2025-02-07 | Stop reason: HOSPADM

## 2025-02-07 RX ORDER — ONDANSETRON 2 MG/ML
INJECTION INTRAMUSCULAR; INTRAVENOUS AS NEEDED
Status: DISCONTINUED | OUTPATIENT
Start: 2025-02-07 | End: 2025-02-07 | Stop reason: SURG

## 2025-02-07 RX ORDER — SODIUM CHLORIDE, SODIUM LACTATE, POTASSIUM CHLORIDE, CALCIUM CHLORIDE 600; 310; 30; 20 MG/100ML; MG/100ML; MG/100ML; MG/100ML
9 INJECTION, SOLUTION INTRAVENOUS CONTINUOUS PRN
Status: DISCONTINUED | OUTPATIENT
Start: 2025-02-07 | End: 2025-02-07 | Stop reason: HOSPADM

## 2025-02-07 RX ORDER — LIDOCAINE 50 MG/G
1 OINTMENT TOPICAL
Qty: 30 G | Refills: 0 | Status: SHIPPED | OUTPATIENT
Start: 2025-02-07

## 2025-02-07 RX ORDER — SODIUM CHLORIDE 0.9 % (FLUSH) 0.9 %
10 SYRINGE (ML) INJECTION EVERY 12 HOURS SCHEDULED
Status: DISCONTINUED | OUTPATIENT
Start: 2025-02-07 | End: 2025-02-07 | Stop reason: HOSPADM

## 2025-02-07 RX ORDER — SODIUM PHOSPHATE,MONO-DIBASIC 19G-7G/118
1 ENEMA (ML) RECTAL ONCE
Status: COMPLETED | OUTPATIENT
Start: 2025-02-07 | End: 2025-02-07

## 2025-02-07 RX ORDER — MIDAZOLAM HYDROCHLORIDE 2 MG/2ML
2 INJECTION, SOLUTION INTRAMUSCULAR; INTRAVENOUS ONCE
Status: COMPLETED | OUTPATIENT
Start: 2025-02-07 | End: 2025-02-07

## 2025-02-07 RX ORDER — ONDANSETRON 2 MG/ML
4 INJECTION INTRAMUSCULAR; INTRAVENOUS ONCE AS NEEDED
Status: DISCONTINUED | OUTPATIENT
Start: 2025-02-07 | End: 2025-02-07 | Stop reason: HOSPADM

## 2025-02-07 RX ORDER — LIDOCAINE HYDROCHLORIDE 20 MG/ML
INJECTION, SOLUTION EPIDURAL; INFILTRATION; INTRACAUDAL; PERINEURAL AS NEEDED
Status: DISCONTINUED | OUTPATIENT
Start: 2025-02-07 | End: 2025-02-07 | Stop reason: SURG

## 2025-02-07 RX ORDER — SODIUM CHLORIDE 0.9 % (FLUSH) 0.9 %
10 SYRINGE (ML) INJECTION AS NEEDED
Status: DISCONTINUED | OUTPATIENT
Start: 2025-02-07 | End: 2025-02-07 | Stop reason: HOSPADM

## 2025-02-07 RX ORDER — PROMETHAZINE HYDROCHLORIDE 12.5 MG/1
25 TABLET ORAL ONCE AS NEEDED
Status: DISCONTINUED | OUTPATIENT
Start: 2025-02-07 | End: 2025-02-07 | Stop reason: HOSPADM

## 2025-02-07 RX ORDER — OXYCODONE AND ACETAMINOPHEN 7.5; 325 MG/1; MG/1
1 TABLET ORAL EVERY 6 HOURS PRN
Qty: 14 TABLET | Refills: 0 | Status: SHIPPED | OUTPATIENT
Start: 2025-02-07 | End: 2025-02-11 | Stop reason: SDUPTHER

## 2025-02-07 RX ORDER — POLYETHYLENE GLYCOL 3350 17 G/17G
17 POWDER, FOR SOLUTION ORAL 2 TIMES DAILY
Qty: 28 PACKET | Refills: 0 | Status: SHIPPED | OUTPATIENT
Start: 2025-02-07 | End: 2025-02-21

## 2025-02-07 RX ORDER — SODIUM CHLORIDE, SODIUM LACTATE, POTASSIUM CHLORIDE, CALCIUM CHLORIDE 600; 310; 30; 20 MG/100ML; MG/100ML; MG/100ML; MG/100ML
50 INJECTION, SOLUTION INTRAVENOUS CONTINUOUS
Status: DISCONTINUED | OUTPATIENT
Start: 2025-02-07 | End: 2025-02-07 | Stop reason: HOSPADM

## 2025-02-07 RX ORDER — PROPOFOL 10 MG/ML
VIAL (ML) INTRAVENOUS AS NEEDED
Status: DISCONTINUED | OUTPATIENT
Start: 2025-02-07 | End: 2025-02-07 | Stop reason: SURG

## 2025-02-07 RX ORDER — ONDANSETRON 4 MG/1
4 TABLET, FILM COATED ORAL EVERY 8 HOURS PRN
Qty: 12 TABLET | Refills: 1 | Status: SHIPPED | OUTPATIENT
Start: 2025-02-07 | End: 2026-02-07

## 2025-02-07 RX ORDER — SODIUM CHLORIDE 9 MG/ML
40 INJECTION, SOLUTION INTRAVENOUS AS NEEDED
Status: DISCONTINUED | OUTPATIENT
Start: 2025-02-07 | End: 2025-02-07 | Stop reason: HOSPADM

## 2025-02-07 RX ORDER — ACETAMINOPHEN 500 MG
1000 TABLET ORAL ONCE
Status: COMPLETED | OUTPATIENT
Start: 2025-02-07 | End: 2025-02-07

## 2025-02-07 RX ORDER — DEXAMETHASONE SODIUM PHOSPHATE 4 MG/ML
INJECTION, SOLUTION INTRA-ARTICULAR; INTRALESIONAL; INTRAMUSCULAR; INTRAVENOUS; SOFT TISSUE AS NEEDED
Status: DISCONTINUED | OUTPATIENT
Start: 2025-02-07 | End: 2025-02-07 | Stop reason: SURG

## 2025-02-07 RX ORDER — OXYCODONE HYDROCHLORIDE 5 MG/1
5 TABLET ORAL
Status: DISCONTINUED | OUTPATIENT
Start: 2025-02-07 | End: 2025-02-07 | Stop reason: HOSPADM

## 2025-02-07 RX ORDER — KETOROLAC TROMETHAMINE 15 MG/ML
INJECTION, SOLUTION INTRAMUSCULAR; INTRAVENOUS AS NEEDED
Status: DISCONTINUED | OUTPATIENT
Start: 2025-02-07 | End: 2025-02-07 | Stop reason: SURG

## 2025-02-07 RX ORDER — SCOPOLAMINE 1 MG/3D
1 PATCH, EXTENDED RELEASE TRANSDERMAL ONCE
Status: DISCONTINUED | OUTPATIENT
Start: 2025-02-07 | End: 2025-02-07 | Stop reason: HOSPADM

## 2025-02-07 RX ORDER — MEPERIDINE HYDROCHLORIDE 25 MG/ML
12.5 INJECTION INTRAMUSCULAR; INTRAVENOUS; SUBCUTANEOUS
Status: DISCONTINUED | OUTPATIENT
Start: 2025-02-07 | End: 2025-02-07 | Stop reason: HOSPADM

## 2025-02-07 RX ORDER — IBUPROFEN 600 MG/1
600 TABLET, FILM COATED ORAL 3 TIMES DAILY
Qty: 15 TABLET | Refills: 0 | Status: SHIPPED | OUTPATIENT
Start: 2025-02-07 | End: 2025-02-13

## 2025-02-07 RX ORDER — PROMETHAZINE HYDROCHLORIDE 12.5 MG/1
25 TABLET ORAL ONCE
Status: COMPLETED | OUTPATIENT
Start: 2025-02-07 | End: 2025-02-07

## 2025-02-07 RX ADMIN — SODIUM CHLORIDE, POTASSIUM CHLORIDE, SODIUM LACTATE AND CALCIUM CHLORIDE 9 ML/HR: 600; 310; 30; 20 INJECTION, SOLUTION INTRAVENOUS at 11:38

## 2025-02-07 RX ADMIN — LIDOCAINE HYDROCHLORIDE 100 MG: 20 INJECTION, SOLUTION EPIDURAL; INFILTRATION; INTRACAUDAL; PERINEURAL at 15:01

## 2025-02-07 RX ADMIN — HYDROMORPHONE HYDROCHLORIDE 0.5 MG: 1 INJECTION, SOLUTION INTRAMUSCULAR; INTRAVENOUS; SUBCUTANEOUS at 14:59

## 2025-02-07 RX ADMIN — MIDAZOLAM HYDROCHLORIDE 2 MG: 1 INJECTION, SOLUTION INTRAMUSCULAR; INTRAVENOUS at 14:40

## 2025-02-07 RX ADMIN — PROPOFOL 175 MCG/KG/MIN: 10 INJECTION, EMULSION INTRAVENOUS at 15:01

## 2025-02-07 RX ADMIN — SCOLOPAMINE TRANSDERMAL SYSTEM 1 PATCH: 1 PATCH, EXTENDED RELEASE TRANSDERMAL at 11:38

## 2025-02-07 RX ADMIN — ONDANSETRON 4 MG: 2 INJECTION INTRAMUSCULAR; INTRAVENOUS at 15:07

## 2025-02-07 RX ADMIN — KETOROLAC TROMETHAMINE 15 MG: 15 INJECTION, SOLUTION INTRAMUSCULAR; INTRAVENOUS at 15:07

## 2025-02-07 RX ADMIN — ACETAMINOPHEN 1000 MG: 500 TABLET ORAL at 11:38

## 2025-02-07 RX ADMIN — SODIUM CHLORIDE 2000 MG: 9 INJECTION, SOLUTION INTRAVENOUS at 15:07

## 2025-02-07 RX ADMIN — DEXAMETHASONE SODIUM PHOSPHATE 8 MG: 4 INJECTION, SOLUTION INTRAMUSCULAR; INTRAVENOUS at 15:07

## 2025-02-07 RX ADMIN — PROPOFOL 150 MG: 10 INJECTION, EMULSION INTRAVENOUS at 15:01

## 2025-02-07 RX ADMIN — SODIUM PHOSPHATE, DIBASIC AND SODIUM PHOSPHATE, MONOBASIC 1 ENEMA: 7; 19 ENEMA RECTAL at 11:45

## 2025-02-07 RX ADMIN — PROMETHAZINE HYDROCHLORIDE 25 MG: 12.5 TABLET ORAL at 13:49

## 2025-02-07 NOTE — DISCHARGE INSTRUCTIONS
DISCHARGE INSTRUCTIONS  SURGICAL / AMBULATORY  PROCEDURES      For your surgery you had:  General anesthesia (you may have a sore throat for the first 24 hours)  IV sedation.  Local anesthesia  Monitored anesthesia Care  You received a medicated patch for nausea prevention today (behind your ear). It is recommended that you remove it 24-48 hours post-operatively. It must be removed within 72 hours. BEHIND RIGHT EAR   You have received an anesthesia medication today that can cause hormonal forms of birth control to be ineffective. You should use a different form of birth control (to prevent pregnancy) for 7 days.   You may experience dizziness, drowsiness, or light-headedness for several hours following surgery/procedure.  Do not stay alone today or tonight.  Limit your activity for 24 hours.  Resume your diet slowly.  Follow whatever special dietary instructions you may have been given by your doctor.  You should not drive or operate machinery, drink alcohol, or sign legally binding documents for 24 hours or while you are taking pain medication.  Last dose of pain medication was given at:   Tylenol at 1138am may start next dose of pain meds after 338pm today   NOTIFY YOUR DOCTOR IF YOU EXPERIENCE ANY OF THE FOLLOWING:  Temperature greater than 101 degrees Fahrenheit  Shaking Chills  Redness or excessive drainage from incision  Nausea, vomiting and/or pain that is not controlled by prescribed medications  Increase in bleeding or bleeding that is excessive  Unable to urinate in 6 hours after surgery  If unable to reach your doctor, please go to the closest Emergency Room  You may begin dressing changes:     [] in 24 hours   [x] in 48 hours   [] Other:      You may shower NO TUB BATHS X2 WEEKS   .  Apply an ice pack 24-48 hours.  Medications per physician instructions as indicated on Discharge Medication Information Sheet.  You should see Dr. Yo  for follow-up care   on as scheduled/ as needed   .  Phone number:  [phone]      SPECIAL INSTRUCTIONS:     OTC STOOL SOFTNERS AS NEEDED WHILE TAKING PAIN MEDS

## 2025-02-07 NOTE — ANESTHESIA POSTPROCEDURE EVALUATION
Patient: Judith Begum    Procedure Summary       Date: 02/07/25 Room / Location: [facility] OR  / [facility]    Anesthesia Start: 1457 Anesthesia Stop: 1530    Procedure: PERINEAL/ANAL CONDYLOMA EXCISION/FULGURATION, (Perineum) Diagnosis:       Anal condyloma      (Anal condyloma [A63.0])    Surgeons: Fermín Yo MD Provider: Jai Mims MD    Anesthesia Type: general ASA Status: 2            Anesthesia Type: general    Vitals  Vitals Value Taken Time   /87 02/07/25 1545   Temp 36.7 °C (98.1 °F) 02/07/25 1529   Pulse 87 02/07/25 1547   Resp 14 02/07/25 1534   SpO2 98 % 02/07/25 1547   Vitals shown include unfiled device data.      Post Anesthesia Care and Evaluation    Patient location during evaluation: bedside  Patient participation: complete - patient participated  Level of consciousness: awake    Airway patency: patent  PONV Status: none  Cardiovascular status: acceptable  Respiratory status: acceptable  Hydration status: acceptable

## 2025-02-07 NOTE — ANESTHESIA PREPROCEDURE EVALUATION
Anesthesia Evaluation     Patient summary reviewed and Nursing notes reviewed   history of anesthetic complications:  PONV               Airway   Mallampati: I  TM distance: >3 FB  Neck ROM: full  No difficulty expected  Dental      Pulmonary - negative pulmonary ROS and normal exam    breath sounds clear to auscultation  Cardiovascular - normal exam    Rhythm: regular  Rate: normal    (+) hypertension      Neuro/Psych  (+) psychiatric history Anxiety, Depression and ADHD  GI/Hepatic/Renal/Endo - negative ROS     Musculoskeletal (-) negative ROS    Abdominal    Substance History - negative use     OB/GYN negative ob/gyn ROS         Other                    Anesthesia Plan    ASA 2     general     intravenous induction     Anesthetic plan, risks, benefits, and alternatives have been provided, discussed and informed consent has been obtained with: patient.    CODE STATUS:

## 2025-02-07 NOTE — OP NOTE
OP NOTE  PERINEAL/ANAL CONDYLOMA EXCISION/FULGURATION  Procedure Report    Patient Name:  Judith Begum  YOB: 1980  [id number]    Date of Surgery:  2/7/2025     Indications: See last clinic note for indications, discussion of risk benefits and alternatives    Pre-op Diagnosis:   Anal condyloma [A63.0]       Post-Op Diagnosis Codes:     * Anal condyloma [A63.0]    Procedure/CPT® Codes:  Excision and fulguration of extensive anal condyloma      Staff:  Surgeon(s):  Fermín Yo MD    Assistant: Tyra Valverde    Anesthesia: General, Local    Estimated Blood Loss: 5 mL    Implants:    Nothing was implanted during the procedure    Specimen:          Specimens       ID Source Type Tests Collected By Collected At Frozen?    A Anus Tissue TISSUE PATHOLOGY EXAM   Fermín Yo MD 2/7/25 1511     Description: right anal canal    This specimen was not marked as sent.    B Anus Tissue TISSUE PATHOLOGY EXAM   Fermín Yo MD 2/7/25 1513     Description: anterior anal canal    This specimen was not marked as sent.    C Anus Tissue TISSUE PATHOLOGY EXAM   Fermín Yo MD 2/7/25 1514     Description: posterior anal canal    This specimen was not marked as sent.    D Anus Tissue TISSUE PATHOLOGY EXAM   Fermín Yo MD 2/7/25 1516     Description: left anal canal    This specimen was not marked as sent.              Findings: Four-quadrant excision of anal transition zone condyloma with fulguration of anal transition zone.  No external anal condyloma.    Complications: None    Description of Procedure:   After all questions were answered, consent was verified.  Patient brought to the operating room per stretcher placed in supine position arms out all extremities padded.  Bilateral lower extremity SCDs placed.  Anesthesia induced.  Preoperative IV antibiotics administered.  Patient placed in candycane lithotomy.  Patient's perianal area prepped with Betadine.   Draped in usual sterile fashion.  Critical timeout taken.  Began the procedure with exam under anesthesia.  Digital rectal exam with no mass and no blood.  No external anal condyloma seen.  I then used a Basurto bivalve retractor perform circumferential inspection.  I then excised anal condyloma from the right anal canal with this sent to pathology for permanent.  I fulgurated the right anal transition zone.  This was repeated for anterior, left, posterior with each sent to pathology separately.  Anal transition zone fulgurated circumferentially.  I verified hemostasis.  I infiltrated with local.  I placed Xeroform gauze in the anus followed by dressing.  At the end of the procedure all counts were correct.  I was present for the procedure.    Assistant: Tyra Valverde was responsible for performing the following activities: Retraction, Suction, and Placing Dressing and their skilled assistance was necessary for the success of this case.    Fermín Yo MD     Date: 2/7/2025  Time: 15:25 EST

## 2025-02-11 ENCOUNTER — TELEPHONE (OUTPATIENT)
Dept: SURGERY | Facility: CLINIC | Age: 45
End: 2025-02-11
Payer: COMMERCIAL

## 2025-02-11 DIAGNOSIS — A63.0 ANAL CONDYLOMA: ICD-10-CM

## 2025-02-11 RX ORDER — OXYCODONE AND ACETAMINOPHEN 7.5; 325 MG/1; MG/1
1 TABLET ORAL EVERY 6 HOURS PRN
Qty: 10 TABLET | Refills: 0 | Status: SHIPPED | OUTPATIENT
Start: 2025-02-11 | End: 2026-02-11

## 2025-02-11 NOTE — TELEPHONE ENCOUNTER
PATIENT HAD SURGERY 02/07/25 BY DR. TORRES.      SHE ASKED IF A FEW MORE PAIN MEDS CAN BE SENT TO HER PHARMACY.    PHARMACY VERIFIED.    DR. TORRES IS OUT OF OFFICE TODAY.  WILL YOU DR. LONDONO ADDRESS?    #[phone]

## 2025-02-13 ENCOUNTER — OFFICE VISIT (OUTPATIENT)
Dept: FAMILY MEDICINE CLINIC | Facility: CLINIC | Age: 45
End: 2025-02-13
Payer: COMMERCIAL

## 2025-02-13 VITALS
SYSTOLIC BLOOD PRESSURE: 160 MMHG | WEIGHT: 159 LBS | OXYGEN SATURATION: 100 % | BODY MASS INDEX: 26.49 KG/M2 | HEIGHT: 65 IN | HEART RATE: 86 BPM | DIASTOLIC BLOOD PRESSURE: 100 MMHG

## 2025-02-13 DIAGNOSIS — F41.9 ANXIETY: ICD-10-CM

## 2025-02-13 DIAGNOSIS — I10 PRIMARY HYPERTENSION: ICD-10-CM

## 2025-02-13 DIAGNOSIS — R42 VERTIGO: ICD-10-CM

## 2025-02-13 DIAGNOSIS — F33.0 MAJOR DEPRESSIVE DISORDER, RECURRENT, MILD: ICD-10-CM

## 2025-02-13 DIAGNOSIS — Z00.00 ANNUAL PHYSICAL EXAM: Primary | ICD-10-CM

## 2025-02-13 LAB
CYTO UR: NORMAL
LAB AP CASE REPORT: NORMAL
LAB AP CLINICAL INFORMATION: NORMAL
LAB AP SPECIAL STAINS: NORMAL
PATH REPORT.FINAL DX SPEC: NORMAL
PATH REPORT.GROSS SPEC: NORMAL

## 2025-02-13 PROCEDURE — 99396 PREV VISIT EST AGE 40-64: CPT | Performed by: NURSE PRACTITIONER

## 2025-02-13 PROCEDURE — 99214 OFFICE O/P EST MOD 30 MIN: CPT | Performed by: NURSE PRACTITIONER

## 2025-02-13 RX ORDER — METOPROLOL SUCCINATE 25 MG/1
25 TABLET, EXTENDED RELEASE ORAL DAILY
Qty: 90 TABLET | Refills: 0 | Status: SHIPPED | OUTPATIENT
Start: 2025-02-13

## 2025-02-13 NOTE — PROGRESS NOTES
Chief Complaint  Annual Physical, depression, anxiety, HTN, Vertigo    Subjective            Judith Margot Begum presents to [facility]  History of Present Illness  PT here for an Annual Physical and f/u on  depression, anxiety, HTN and Vertigo. No issues or concerns at this time.     PT reports she had rectal surgery 2/7/2025 with Dr. 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  She report she is in a lot of pain from this surgery and has percocet at home to take that helps but did not take it today due to driving.  She thinks this may be why her BP is elevated.    Pt's BP is elevated in office today at 160/100, manual check. Pt denies any H/A or CP or SOA.     Labs:  UTD 10/2024    PAP: 1/2025, Dr. Rae     Mammogram:  scheduled for Feb 25, 2025    PT is due Tdap vaccine. Pt declines and understands the risk of not having.     Colonoscopy:  Nov. 2024, repeat in 5 years          Past Medical History:   Diagnosis Date    ADD (attention deficit disorder)     Anxiety     Depression     Eczema     Hypertension     PONV (postoperative nausea and vomiting)        Allergies   Allergen Reactions    Latex Anaphylaxis    Formaldehyde Rash        Past Surgical History:   Procedure Laterality Date    COLONOSCOPY N/A 11/5/2024    Procedure: COLONOSCOPY W/ HOT& COLD SNARE POLYPECTOMIES;  Surgeon: Fermín Yo MD;  Location: [facility];  Service: General;  Laterality: N/A;  COLON POLYP, DIVERTICULOSIS, ANAL POLYP    DILATATION AND CURETTAGE  2009    KNEE ACL RECONSTRUCTION  2011    PERINEAL/ANAL CONDYLOMA EXCISION/FULGURATION N/A 2/7/2025    Procedure: PERINEAL/ANAL CONDYLOMA EXCISION/FULGURATION,;  Surgeon: Fermín Yo MD;  Location: [facility];  Service: General;  Laterality: N/A;    THYROID SURGERY          Social History     Tobacco Use    Smoking status: Former     Types: Cigarettes    Smokeless tobacco: Never   Substance Use Topics    Alcohol use: Never      "Comment: OCCASIONALLY       Family History   Problem Relation Age of Onset    Stroke Father     Lung cancer Father     Colon cancer Maternal Grandmother     Diabetes Maternal Grandmother     Lung cancer Paternal Grandfather     Breast cancer Other     Malig Hyperthermia Neg Hx         Current Outpatient Medications on File Prior to Visit   Medication Sig    clindamycin (CLEOCIN T) 1 % lotion     Dupilumab (Dupixent) 300 MG/2ML solution prefilled syringe 2 mL.    escitalopram (Lexapro) 20 MG tablet Take 1/2 tab 10mg q day for 2 weeks then 1 tab 20mg q day    ibuprofen (ADVIL,MOTRIN) 600 MG tablet Take 1 tablet by mouth 3 times a day for 5 days. Take with food    lidocaine (XYLOCAINE) 5 % ointment Apply 1 Application topically to the appropriate area as directed Every 2 (Two) Hours As Needed for Mild Pain.    lisinopril-hydrochlorothiazide (Zestoretic) 10-12.5 MG per tablet Take 1 tablet by mouth 2 (Two) Times a Day.    meclizine (ANTIVERT) 25 MG tablet Take 2 tablets by mouth 3 (Three) Times a Day As Needed for Dizziness.    ondansetron (Zofran) 4 MG tablet Take 1 tablet by mouth Every 8 (Eight) Hours As Needed for Nausea or Vomiting.    oxyCODONE-acetaminophen (Percocet) 7.5-325 MG per tablet Take 1 tablet by mouth Every 6 (Six) Hours As Needed for Moderate Pain.    polyethylene glycol (MIRALAX) 17 g packet Take 17 g by mouth 2 (Two) Times a Day for 14 days.     No current facility-administered medications on file prior to visit.       Health Maintenance Due   Topic Date Due    COVID-19 Vaccine (3 - 2024-25 season) 09/01/2024    BMI FOLLOWUP  12/12/2024       Objective     /100   Pulse 86   Ht 165.1 cm (65\")   Wt 72.1 kg (159 lb)   SpO2 100%   BMI 26.46 kg/m²       Physical Exam  Constitutional:       General: She is not in acute distress.     Appearance: Normal appearance. She is not ill-appearing.   HENT:      Head: Normocephalic and atraumatic.      Right Ear: Tympanic membrane, ear canal and external " ear normal.      Left Ear: Tympanic membrane, ear canal and external ear normal.      Nose: Nose normal.   Cardiovascular:      Rate and Rhythm: Normal rate and regular rhythm.      Heart sounds: Normal heart sounds. No murmur heard.  Pulmonary:      Effort: Pulmonary effort is normal. No respiratory distress.      Breath sounds: Normal breath sounds.   Chest:      Chest wall: No tenderness.   Abdominal:      General: Abdomen is flat. Bowel sounds are normal. There is no distension.      Palpations: Abdomen is soft. There is no mass.      Tenderness: There is no abdominal tenderness. There is no guarding.   Musculoskeletal:         General: No swelling or tenderness. Normal range of motion.      Cervical back: Normal range of motion and neck supple.   Skin:     General: Skin is warm and dry.      Findings: No rash.   Neurological:      General: No focal deficit present.      Mental Status: She is alert and oriented to person, place, and time. Mental status is at baseline.      Gait: Gait normal.   Psychiatric:         Attention and Perception: Attention normal.         Mood and Affect: Mood and affect normal.         Speech: Speech normal.         Behavior: Behavior normal. Behavior is cooperative.         Thought Content: Thought content normal. Thought content does not include suicidal ideation.         Judgment: Judgment normal.       BMI is >= 25 and <30. (Overweight) The following options were offered after discussion;: exercise counseling/recommendations and nutrition counseling/recommendations     Result Review :                           Assessment and Plan        Diagnoses and all orders for this visit:    1. Annual physical exam (Primary)    2. Major depressive disorder, recurrent, mild  Comments:  stable on Lexapro 10mg, continue    3. Anxiety  Comments:  stable on Lexapro 10mg, continue    4. Primary hypertension  Comments:  elevated on Zestoretic 10/12.5mg, continue zestoretic will add toprol 25mg daily  and advised pt to keep a bp log and bring to f/u appt  Orders:  -     metoprolol succinate XL (Toprol XL) 25 MG 24 hr tablet; Take 1 tablet by mouth Daily.  Dispense: 90 tablet; Refill: 0    5. Vertigo  Comments:  stable on Antivert 25mg prn, continue    Preventative Counseling:  Healthy diet  Daily exercise  Get adequate sleep          Follow Up     Return in about 1 month (around 3/13/2025).    Patient was given instructions and counseling regarding her condition or for health maintenance advice. Please see specific information pulled into the AVS if appropriate.     Judithgerda Frost Kel  reports that she has quit smoking. Her smoking use included cigarettes. She has never used smokeless tobacco.          Vanessa Montalvo, APRN

## 2025-02-21 ENCOUNTER — HOSPITAL ENCOUNTER (OUTPATIENT)
Dept: MAMMOGRAPHY | Facility: HOSPITAL | Age: 45
Discharge: HOME | End: 2025-02-21
Admitting: NURSE PRACTITIONER
Payer: COMMERCIAL

## 2025-02-21 DIAGNOSIS — Z12.31 ENCOUNTER FOR SCREENING MAMMOGRAM FOR MALIGNANT NEOPLASM OF BREAST: ICD-10-CM

## 2025-02-21 PROCEDURE — 77067 SCR MAMMO BI INCL CAD: CPT

## 2025-02-21 PROCEDURE — 77063 BREAST TOMOSYNTHESIS BI: CPT

## 2025-02-27 ENCOUNTER — OFFICE VISIT (OUTPATIENT)
Dept: SURGERY | Facility: CLINIC | Age: 45
End: 2025-02-27
Payer: COMMERCIAL

## 2025-02-27 VITALS — WEIGHT: 157 LBS | RESPIRATION RATE: 18 BRPM | HEIGHT: 65 IN | BODY MASS INDEX: 26.16 KG/M2

## 2025-02-27 DIAGNOSIS — A63.0 ANAL CONDYLOMA: Primary | ICD-10-CM

## 2025-02-27 PROCEDURE — 99024 POSTOP FOLLOW-UP VISIT: CPT | Performed by: SURGERY

## 2025-02-27 NOTE — LETTER
February 28, 2025     RIVERA Valle  [address]  [address]  [place] [address]    Patient: Judith Begum   YOB: 1980   Date of Visit: 2/27/2025     Dear RIVERA Valle:       Thank you for referring Judith Begum to me for evaluation. Below are the relevant portions of my assessment and plan of care.    If you have questions, please do not hesitate to call me. I look forward to following Judith along with you.         Sincerely,        Fermín Yo MD        CC: No Recipients    Fermín Yo MD  02/28/25 0747  Sign when Signing Visit  General Surgery/Colorectal Surgery   Post -op Follow up Note    Patient Name:  Judith Begum  YOB: 1980  [id number]    Referring Provider: No ref. provider found    Patient Care Team:  Vanessa Montalvo APRN as PCP - General (Nurse Practitioner)  William Rae MD as Consulting Physician (Obstetrics and Gynecology)  Stacey Yoon APRN as Nurse Practitioner (Nurse Practitioner)    Chief complaint follow-up    Subjective.     History of present illness:    Status post colonoscopy 11/5/2024 with colon polyp removed, polyp in the anus removed.  Biopsy with sessile serrated lesion in the transverse colon, anal polyp with condyloma.    HIV testing negative 2/7/2025    Status post excision and fulguration of anal condyloma 2/7/2025.  Pathology with 3 quadrants benign, left anal canal with focal low-grade squamous intraepithelial lesion    She comes in for follow-up.  She is feeling much better.  No fever.  No bleeding.  Her Pap smear is up-to-date    History:  Past Medical History:   Diagnosis Date   • ADD (attention deficit disorder)    • Anxiety    • Colon polyp 2025   • Depression    • Eczema    • Hypertension    • PONV (postoperative nausea and vomiting)    • Thyroid nodule 2019       Past Surgical History:   Procedure Laterality Date   • COLONOSCOPY N/A 11/5/2024    Procedure: COLONOSCOPY W/ HOT&  COLD SNARE POLYPECTOMIES;  Surgeon: Fermín Yo MD;  Location: [facility] END[facility]COPY;  Service: General;  Laterality: N/A;  COLON POLYP, DIVERTICULOSIS, ANAL POLYP   • DILATATION AND CURETTAGE  2009   • KNEE ACL RECONSTRUCTION  2011   • PERINEAL/ANAL CONDYLOMA EXCISION/FULGURATION N/A 2/7/2025    Procedure: PERINEAL/ANAL CONDYLOMA EXCISION/FULGURATION,;  Surgeon: Fermín Yo MD;  Location: [facility];  Service: General;  Laterality: N/A;   • THYROID SURGERY         Family History   Problem Relation Age of Onset   • Stroke Father    • Lung cancer Father    • Cancer Father    • Colon cancer Maternal Grandmother    • Diabetes Maternal Grandmother    • Lung cancer Paternal Grandfather    • Breast cancer Other    • Arthritis Mother    • Hyperlipidemia Mother    • Hyperlipidemia Sister    • Malig Hyperthermia Neg Hx        Social History     Tobacco Use   • Smoking status: Former     Current packs/day: 0.00     Types: Cigarettes   • Smokeless tobacco: Never   Vaping Use   • Vaping status: Never Used   Substance Use Topics   • Alcohol use: Never     Comment: OCCASIONALLY   • Drug use: Never       MEDS:  Prior to Admission medications    Medication Sig Start Date End Date Taking? Authorizing Provider   clindamycin (CLEOCIN T) 1 % lotion  9/25/23  Yes ProvideraMyi MD   Dupilumab (Dupixent) 300 MG/2ML solution prefilled syringe 2 mL.   Yes ProviderMayi MD   escitalopram (Lexapro) 20 MG tablet Take 1/2 tab 10mg q day for 2 weeks then 1 tab 20mg q day 10/1/24  Yes Vanessa Montalvo APRN   lidocaine (XYLOCAINE) 5 % ointment Apply 1 Application topically to the appropriate area as directed Every 2 (Two) Hours As Needed for Mild Pain. 2/7/25  Yes Fermín Yo MD   lisinopril-hydrochlorothiazide (Zestoretic) 10-12.5 MG per tablet Take 1 tablet by mouth 2 (Two) Times a Day. 10/1/24  Yes Vanessa Montalvo APRN   meclizine (ANTIVERT) 25 MG tablet Take 2 tablets by mouth 3 (Three) Times a Day  As Needed for Dizziness. 9/27/24  Yes Alfie Caldwell PA-C   metoprolol succinate XL (Toprol XL) 25 MG 24 hr tablet Take 1 tablet by mouth Daily. 2/13/25  Yes Vanessa Montalvo APRN   ondansetron (Zofran) 4 MG tablet Take 1 tablet by mouth Every 8 (Eight) Hours As Needed for Nausea or Vomiting. 2/7/25 2/7/26 Yes Fermín Yo MD   oxyCODONE-acetaminophen (Percocet) 7.5-325 MG per tablet Take 1 tablet by mouth Every 6 (Six) Hours As Needed for Moderate Pain. 2/11/25 2/11/26 Yes Dejon Rashid MD             No current facility-administered medications for this visit.       Allergies:  Latex and Formaldehyde    Objective    Vital Signs   Resp:  [18] 18    Physical Exam:     No acute distress, sitting comfortably    Results Review: I have reviewed the patient's labs and imaging    LABS/IMAGING:    Imaging Results (Last 72 Hours)       ** No results found for the last 72 hours. **             Lab Results (last 72 hours)       ** No results found for the last 72 hours. **               Result Review:Labs  Result Review  Imaging  Med Tab  Media    Assessment & Plan    * No active hospital problems. *     Status post colonoscopy 11/5/2024 with colon polyp removed, polyp in the anus removed.  Biopsy with sessile serrated lesion in the transverse colon, anal polyp with condyloma.    HIV testing negative 2/7/2025    Status post excision and fulguration of anal condyloma 2/7/2025.  Pathology with 3 quadrants benign, left anal canal with focal low-grade squamous intraepithelial lesion    I reviewed the details of the surgery with the patient.  I reviewed her labs.  I reviewed her pathology.  Follow-up with me in 6 months.  All questions answered.  She agrees with the plan.  Thank you for the consult          Fermín Yo MD  02/28/25 07:45 EST

## 2025-02-28 NOTE — PROGRESS NOTES
General Surgery/Colorectal Surgery   Post -op Follow up Note    Patient Name:  Judith Begum  YOB: 1980  [id number]    Referring Provider: No ref. provider found    Patient Care Team:  Vanessa Montalvo APRN as PCP - General (Nurse Practitioner)  William Rae MD as Consulting Physician (Obstetrics and Gynecology)  Stacey Yoon APRN as Nurse Practitioner (Nurse Practitioner)    Chief complaint follow-up    Subjective .     History of present illness:    Status post colonoscopy 11/5/2024 with colon polyp removed, polyp in the anus removed.  Biopsy with sessile serrated lesion in the transverse colon, anal polyp with condyloma.    HIV testing negative 2/7/2025    Status post excision and fulguration of anal condyloma 2/7/2025.  Pathology with 3 quadrants benign, left anal canal with focal low-grade squamous intraepithelial lesion    She comes in for follow-up.  She is feeling much better.  No fever.  No bleeding.  Her Pap smear is up-to-date    History:  Past Medical History:   Diagnosis Date    ADD (attention deficit disorder)     Anxiety     Colon polyp 2025    Depression     Eczema     Hypertension     PONV (postoperative nausea and vomiting)     Thyroid nodule 2019       Past Surgical History:   Procedure Laterality Date    COLONOSCOPY N/A 11/5/2024    Procedure: COLONOSCOPY W/ HOT& COLD SNARE POLYPECTOMIES;  Surgeon: Fermín Yo MD;  Location: [facility] ENDOSCOPY;  Service: General;  Laterality: N/A;  COLON POLYP, DIVERTICULOSIS, ANAL POLYP    DILATATION AND CURETTAGE  2009    KNEE ACL RECONSTRUCTION  2011    PERINEAL/ANAL CONDYLOMA EXCISION/FULGURATION N/A 2/7/2025    Procedure: PERINEAL/ANAL CONDYLOMA EXCISION/FULGURATION,;  Surgeon: Fermín Yo MD;  Location: [facility];  Service: General;  Laterality: N/A;    THYROID SURGERY         Family History   Problem Relation Age of Onset    Stroke Father     Lung cancer Father     Cancer Father     Colon cancer  Maternal Grandmother     Diabetes Maternal Grandmother     Lung cancer Paternal Grandfather     Breast cancer Other     Arthritis Mother     Hyperlipidemia Mother     Hyperlipidemia Sister     Malig Hyperthermia Neg Hx        Social History     Tobacco Use    Smoking status: Former     Current packs/day: 0.00     Types: Cigarettes    Smokeless tobacco: Never   Vaping Use    Vaping status: Never Used   Substance Use Topics    Alcohol use: Never     Comment: OCCASIONALLY    Drug use: Never       MEDS:  Prior to Admission medications    Medication Sig Start Date End Date Taking? Authorizing Provider   clindamycin (CLEOCIN T) 1 % lotion  9/25/23  Yes Mayi Henderson MD   Dupilumab (Dupixent) 300 MG/2ML solution prefilled syringe 2 mL.   Yes ProviderMayi MD   escitalopram (Lexapro) 20 MG tablet Take 1/2 tab 10mg q day for 2 weeks then 1 tab 20mg q day 10/1/24  Yes Vanessa Montalvo APRN   lidocaine (XYLOCAINE) 5 % ointment Apply 1 Application topically to the appropriate area as directed Every 2 (Two) Hours As Needed for Mild Pain. 2/7/25  Yes Fermín Yo MD   lisinopril-hydrochlorothiazide (Zestoretic) 10-12.5 MG per tablet Take 1 tablet by mouth 2 (Two) Times a Day. 10/1/24  Yes Vanessa Montalvo APRN   meclizine (ANTIVERT) 25 MG tablet Take 2 tablets by mouth 3 (Three) Times a Day As Needed for Dizziness. 9/27/24  Yes Alfie Caldwell PA-C   metoprolol succinate XL (Toprol XL) 25 MG 24 hr tablet Take 1 tablet by mouth Daily. 2/13/25  Yes Vanessa Montalvo APRN   ondansetron (Zofran) 4 MG tablet Take 1 tablet by mouth Every 8 (Eight) Hours As Needed for Nausea or Vomiting. 2/7/25 2/7/26 Yes Fermín Yo MD   oxyCODONE-acetaminophen (Percocet) 7.5-325 MG per tablet Take 1 tablet by mouth Every 6 (Six) Hours As Needed for Moderate Pain. 2/11/25 2/11/26 Yes Dejon Rashid MD             No current facility-administered medications for this visit.       Allergies:  Latex and  Formaldehyde    Objective     Vital Signs   Resp:  [18] 18    Physical Exam:     No acute distress, sitting comfortably    Results Review: I have reviewed the patient's labs and imaging    LABS/IMAGING:    Imaging Results (Last 72 Hours)       ** No results found for the last 72 hours. **             Lab Results (last 72 hours)       ** No results found for the last 72 hours. **               Result Review :Labs  Result Review  Imaging  Med Tab  Media    Assessment & Plan     * No active hospital problems. *     Status post colonoscopy 11/5/2024 with colon polyp removed, polyp in the anus removed.  Biopsy with sessile serrated lesion in the transverse colon, anal polyp with condyloma.    HIV testing negative 2/7/2025    Status post excision and fulguration of anal condyloma 2/7/2025.  Pathology with 3 quadrants benign, left anal canal with focal low-grade squamous intraepithelial lesion    I reviewed the details of the surgery with the patient.  I reviewed her labs.  I reviewed her pathology.  Follow-up with me in 6 months.  All questions answered.  She agrees with the plan.  Thank you for the consult          Fermín Yo MD  02/28/25 07:45 EST

## 2025-03-26 DIAGNOSIS — F41.9 ANXIETY: ICD-10-CM

## 2025-03-26 RX ORDER — ESCITALOPRAM OXALATE 20 MG/1
20 TABLET ORAL DAILY
Qty: 90 TABLET | Refills: 1 | Status: SHIPPED | OUTPATIENT
Start: 2025-03-26

## 2025-04-30 ENCOUNTER — OFFICE VISIT (OUTPATIENT)
Dept: FAMILY MEDICINE CLINIC | Facility: CLINIC | Age: 45
End: 2025-04-30
Payer: COMMERCIAL

## 2025-04-30 VITALS
BODY MASS INDEX: 26.99 KG/M2 | OXYGEN SATURATION: 97 % | DIASTOLIC BLOOD PRESSURE: 74 MMHG | WEIGHT: 162 LBS | HEIGHT: 65 IN | SYSTOLIC BLOOD PRESSURE: 123 MMHG | HEART RATE: 94 BPM

## 2025-04-30 DIAGNOSIS — Z13.29 SCREENING FOR THYROID DISORDER: ICD-10-CM

## 2025-04-30 DIAGNOSIS — Z13.220 SCREENING FOR CHOLESTEROL LEVEL: ICD-10-CM

## 2025-04-30 DIAGNOSIS — F41.9 ANXIETY: ICD-10-CM

## 2025-04-30 DIAGNOSIS — F33.0 MAJOR DEPRESSIVE DISORDER, RECURRENT, MILD: Primary | ICD-10-CM

## 2025-04-30 DIAGNOSIS — I10 PRIMARY HYPERTENSION: ICD-10-CM

## 2025-04-30 PROCEDURE — 99214 OFFICE O/P EST MOD 30 MIN: CPT | Performed by: NURSE PRACTITIONER

## 2025-04-30 RX ORDER — METOPROLOL SUCCINATE 25 MG/1
25 TABLET, EXTENDED RELEASE ORAL DAILY
Qty: 90 TABLET | Refills: 1 | Status: SHIPPED | OUTPATIENT
Start: 2025-04-30

## 2025-04-30 RX ORDER — LISINOPRIL AND HYDROCHLOROTHIAZIDE 10; 12.5 MG/1; MG/1
1 TABLET ORAL 2 TIMES DAILY
Qty: 180 TABLET | Refills: 1 | Status: SHIPPED | OUTPATIENT
Start: 2025-04-30

## 2025-04-30 NOTE — PROGRESS NOTES
Chief Complaint  Anxiety, Hypertension, and Depression    Subjective            Judith Begum presents to [facility]  History of Present Illness  Pt is here for f/u for anxiety, depression and HTN. She has no issues or concerns at this time.     Pt is due labs/orders placed.    Pt is followed by Dr. Rae with GYN.         Past Medical History:   Diagnosis Date    ADD (attention deficit disorder)     Anxiety     Colon polyp 2025    Depression     Eczema     Hypertension     PONV (postoperative nausea and vomiting)     Thyroid nodule 2019       Allergies   Allergen Reactions    Latex Anaphylaxis    Formaldehyde Rash        Past Surgical History:   Procedure Laterality Date    COLONOSCOPY N/A 11/5/2024    Procedure: COLONOSCOPY W/ HOT& COLD SNARE POLYPECTOMIES;  Surgeon: Fermín Yo MD;  Location: [facility] ENDOSCOPY;  Service: General;  Laterality: N/A;  COLON POLYP, DIVERTICULOSIS, ANAL POLYP    DILATATION AND CURETTAGE  2009    KNEE ACL RECONSTRUCTION  2011    PERINEAL/ANAL CONDYLOMA EXCISION/FULGURATION N/A 2/7/2025    Procedure: PERINEAL/ANAL CONDYLOMA EXCISION/FULGURATION,;  Surgeon: Fermín Yo MD;  Location: [facility] OR OSC;  Service: General;  Laterality: N/A;    THYROID SURGERY          Social History     Tobacco Use    Smoking status: Former     Current packs/day: 0.00     Types: Cigarettes    Smokeless tobacco: Never   Substance Use Topics    Alcohol use: Never     Comment: OCCASIONALLY       Family History   Problem Relation Age of Onset    Stroke Father     Lung cancer Father     Cancer Father     Colon cancer Maternal Grandmother     Diabetes Maternal Grandmother     Lung cancer Paternal Grandfather     Breast cancer Other     Arthritis Mother     Hyperlipidemia Mother     Hyperlipidemia Sister     Malig Hyperthermia Neg Hx         Current Outpatient Medications on File Prior to Visit   Medication Sig    clindamycin (CLEOCIN T) 1 % lotion   "   Dupilumab (Dupixent) 300 MG/2ML solution prefilled syringe 2 mL.    escitalopram (LEXAPRO) 20 MG tablet Take 1 tablet by mouth Daily.    [DISCONTINUED] lisinopril-hydrochlorothiazide (Zestoretic) 10-12.5 MG per tablet Take 1 tablet by mouth 2 (Two) Times a Day.    [DISCONTINUED] metoprolol succinate XL (Toprol XL) 25 MG 24 hr tablet Take 1 tablet by mouth Daily.    lidocaine (XYLOCAINE) 5 % ointment Apply 1 Application topically to the appropriate area as directed Every 2 (Two) Hours As Needed for Mild Pain. (Patient not taking: Reported on 4/30/2025)    [DISCONTINUED] meclizine (ANTIVERT) 25 MG tablet Take 2 tablets by mouth 3 (Three) Times a Day As Needed for Dizziness. (Patient not taking: Reported on 4/30/2025)    [DISCONTINUED] ondansetron (Zofran) 4 MG tablet Take 1 tablet by mouth Every 8 (Eight) Hours As Needed for Nausea or Vomiting. (Patient not taking: Reported on 4/30/2025)    [DISCONTINUED] oxyCODONE-acetaminophen (Percocet) 7.5-325 MG per tablet Take 1 tablet by mouth Every 6 (Six) Hours As Needed for Moderate Pain. (Patient not taking: Reported on 4/30/2025)     No current facility-administered medications on file prior to visit.       There are no preventive care reminders to display for this patient.      Objective     /74   Pulse 94   Ht 165.1 cm (65\")   Wt 73.5 kg (162 lb)   SpO2 97%   BMI 26.96 kg/m²       Physical Exam  Constitutional:       General: She is not in acute distress.     Appearance: Normal appearance. She is not ill-appearing.   HENT:      Head: Normocephalic and atraumatic.   Cardiovascular:      Rate and Rhythm: Normal rate and regular rhythm.      Pulses: Normal pulses.      Heart sounds: Normal heart sounds. No murmur heard.  Pulmonary:      Effort: Pulmonary effort is normal. No respiratory distress.      Breath sounds: Normal breath sounds.   Chest:      Chest wall: No tenderness.   Musculoskeletal:         General: No swelling or tenderness. Normal range of " motion.      Cervical back: Normal range of motion and neck supple.   Skin:     General: Skin is warm and dry.      Findings: No rash.   Neurological:      General: No focal deficit present.      Mental Status: She is alert and oriented to person, place, and time. Mental status is at baseline.      Gait: Gait normal.   Psychiatric:         Attention and Perception: Attention normal.         Mood and Affect: Mood and affect normal.         Speech: Speech normal.         Behavior: Behavior normal. Behavior is cooperative.         Thought Content: Thought content normal. Thought content does not include suicidal ideation.         Judgment: Judgment normal.           Result Review :                           Assessment and Plan        Diagnoses and all orders for this visit:    1. Major depressive disorder, recurrent, mild (Primary)  Comments:  stable on Lexapro 20mg, continue    2. Anxiety  Comments:  stable on Lexapro 20mg, continue    3. Primary hypertension  Comments:  stabel on Zestoretic 10/12.5mg and Toprol 25mg, continue  Orders:  -     CBC w AUTO Differential; Future  -     lisinopril-hydrochlorothiazide (Zestoretic) 10-12.5 MG per tablet; Take 1 tablet by mouth 2 (Two) Times a Day.  Dispense: 180 tablet; Refill: 1  -     metoprolol succinate XL (Toprol XL) 25 MG 24 hr tablet; Take 1 tablet by mouth Daily.  Dispense: 90 tablet; Refill: 1    4. Screening for thyroid disorder  -     TSH; Future    5. Screening for cholesterol level  -     Comprehensive metabolic panel; Future  -     Lipid panel; Future              Follow Up     Return in about 6 months (around 10/30/2025).    Patient was given instructions and counseling regarding her condition or for health maintenance advice. Please see specific information pulled into the AVS if appropriate.     Judithgerda RothmanMargot Kel  reports that she has quit smoking. Her smoking use included cigarettes. She has never used smokeless tobacco.

## (undated) DEVICE — DRESSING,GAUZE,XEROFORM,CURAD,5"X9",ST: Brand: CURAD

## (undated) DEVICE — THE SINGLE USE ETRAP – POLYP TRAP IS USED FOR SUCTION RETRIEVAL OF ENDOSCOPICALLY REMOVED POLYPS.: Brand: ETRAP

## (undated) DEVICE — STERILE POLYISOPRENE POWDER-FREE SURGICAL GLOVES WITH EMOLLIENT COATING: Brand: PROTEXIS

## (undated) DEVICE — SNAR E/S POLYP SNAREMASTER OVL/10MM 2.8X2300MM YEL

## (undated) DEVICE — Device

## (undated) DEVICE — VAGINAL PREP TRAY: Brand: MEDLINE INDUSTRIES, INC.

## (undated) DEVICE — KENDALL SCD EXPRESS SLEEVES, KNEE LENGTH, MEDIUM: Brand: KENDALL SCD

## (undated) DEVICE — MINOR-LF: Brand: MEDLINE INDUSTRIES, INC.

## (undated) DEVICE — SINGLE-USE BIOPSY FORCEPS: Brand: RADIAL JAW 4

## (undated) DEVICE — LINER SURG CANSTR SXN S/RIGD 1500CC

## (undated) DEVICE — STERILE POLYISOPRENE POWDER-FREE SURGICAL GLOVES: Brand: PROTEXIS

## (undated) DEVICE — PENCL SMOKE/EVAC MEGADYNE TELESCP 10FT

## (undated) DEVICE — TBG EVAC WAND/SPONGEGUARD 7/8IN 10FT STRL

## (undated) DEVICE — TUBING, SUCTION, 1/4" X 10', STRAIGHT: Brand: MEDLINE

## (undated) DEVICE — PREFLTR SMOKE/EVAC SURGIFRESH PROTECTION PLS 1 1/3IN

## (undated) DEVICE — SOL IRR H2O BO 1000ML STRL

## (undated) DEVICE — SOL IRRG H2O PL/BG 1000ML STRL

## (undated) DEVICE — STRAP STIRUP SLP RNG 19X3.5IN DISP

## (undated) DEVICE — SYR LUERLOK 30CC

## (undated) DEVICE — Device: Brand: DEFENDO AIR/WATER/SUCTION AND BIOPSY VALVE

## (undated) DEVICE — GAUZE,SPONGE,4"X4",16PLY,STRL,LF,10/TRAY: Brand: MEDLINE

## (undated) DEVICE — DRAPE,UNDERBUTTOCKS,PCH,STERILE: Brand: MEDLINE

## (undated) DEVICE — BRIEF KNIT SEAMLSS PREM 70IN 3XL PK/2

## (undated) DEVICE — SOLIDIFIER LIQLOC PLS 1500CC BT

## (undated) DEVICE — CONN JET HYDRA H20 AUXILIARY DISP

## (undated) DEVICE — DRSNG PAD ABD 8X10IN STRL

## (undated) DEVICE — SUREFIT, DUAL DISPERSIVE ELECTRODE, CONTACT QUALITY MONITOR: Brand: SUREFIT

## (undated) DEVICE — YANKAUER,BULB TIP,W/O VENT,RIGID,STERILE: Brand: MEDLINE

## (undated) DEVICE — LEGGINGS, PAIR, CLEAR, STERILE: Brand: MEDLINE

## (undated) DEVICE — GLV SURG BIOGEL LTX PF 7 1/2